# Patient Record
Sex: FEMALE | Race: WHITE | NOT HISPANIC OR LATINO
[De-identification: names, ages, dates, MRNs, and addresses within clinical notes are randomized per-mention and may not be internally consistent; named-entity substitution may affect disease eponyms.]

---

## 2017-01-18 ENCOUNTER — APPOINTMENT (OUTPATIENT)
Dept: ANTEPARTUM | Facility: CLINIC | Age: 27
End: 2017-01-18

## 2017-01-18 VITALS
SYSTOLIC BLOOD PRESSURE: 120 MMHG | DIASTOLIC BLOOD PRESSURE: 70 MMHG | HEIGHT: 66 IN | BODY MASS INDEX: 36.64 KG/M2 | WEIGHT: 228 LBS

## 2017-01-18 VITALS — HEART RATE: 98 BPM | OXYGEN SATURATION: 100 %

## 2017-01-18 DIAGNOSIS — Z82.49 FAMILY HISTORY OF ISCHEMIC HEART DISEASE AND OTHER DISEASES OF THE CIRCULATORY SYSTEM: ICD-10-CM

## 2017-01-18 DIAGNOSIS — Z87.891 PERSONAL HISTORY OF NICOTINE DEPENDENCE: ICD-10-CM

## 2017-01-18 DIAGNOSIS — Z33.1 PREGNANT STATE, INCIDENTAL: ICD-10-CM

## 2017-01-18 LAB
BILIRUB UR QL STRIP: NEGATIVE
CLARITY UR: NORMAL
COLLECTION METHOD: NORMAL
GLUCOSE UR-MCNC: NEGATIVE
HCG UR QL: 0.2 EU/DL
HGB UR QL STRIP.AUTO: NORMAL
KETONES UR-MCNC: NEGATIVE
LEUKOCYTE ESTERASE UR QL STRIP: NEGATIVE
NITRITE UR QL STRIP: NEGATIVE
PH UR STRIP: 8
PROT UR STRIP-MCNC: 1
SP GR UR STRIP: 1.03

## 2017-03-15 ENCOUNTER — APPOINTMENT (OUTPATIENT)
Dept: ANTEPARTUM | Facility: CLINIC | Age: 27
End: 2017-03-15

## 2017-03-15 VITALS — SYSTOLIC BLOOD PRESSURE: 100 MMHG | WEIGHT: 237.9 LBS | DIASTOLIC BLOOD PRESSURE: 64 MMHG | BODY MASS INDEX: 38.4 KG/M2

## 2017-03-15 VITALS — OXYGEN SATURATION: 98 % | HEART RATE: 88 BPM | TEMPERATURE: 98.8 F

## 2017-03-24 ENCOUNTER — INPATIENT (INPATIENT)
Facility: HOSPITAL | Age: 27
LOS: 1 days | Discharge: HOME | End: 2017-03-26
Attending: OBSTETRICS & GYNECOLOGY | Admitting: OBSTETRICS & GYNECOLOGY

## 2017-06-11 ENCOUNTER — EMERGENCY (EMERGENCY)
Facility: HOSPITAL | Age: 27
LOS: 0 days | Discharge: HOME | End: 2017-06-11

## 2017-06-11 DIAGNOSIS — O44.00 COMPLETE PLACENTA PREVIA NOS OR WITHOUT HEMORRHAGE, UNSPECIFIED TRIMESTER: ICD-10-CM

## 2017-06-28 DIAGNOSIS — R45.851 SUICIDAL IDEATIONS: ICD-10-CM

## 2017-06-28 DIAGNOSIS — F41.9 ANXIETY DISORDER, UNSPECIFIED: ICD-10-CM

## 2017-06-28 DIAGNOSIS — Z87.891 PERSONAL HISTORY OF NICOTINE DEPENDENCE: ICD-10-CM

## 2017-08-16 LAB
BILIRUB UR QL STRIP: NEGATIVE
CLARITY UR: CLEAR
COLLECTION METHOD: NORMAL
GLUCOSE UR-MCNC: NEGATIVE
HCG UR QL: 0.2 EU/DL
HGB UR QL STRIP.AUTO: NEGATIVE
KETONES UR-MCNC: NEGATIVE
LEUKOCYTE ESTERASE UR QL STRIP: NEGATIVE
NITRITE UR QL STRIP: NEGATIVE
PH UR STRIP: 6.5
PROT UR STRIP-MCNC: NEGATIVE
SP GR UR STRIP: 1.03

## 2017-09-21 DIAGNOSIS — Z37.1 SINGLE STILLBIRTH: ICD-10-CM

## 2017-09-21 DIAGNOSIS — O36.4XX0 MATERNAL CARE FOR INTRAUTERINE DEATH, NOT APPLICABLE OR UNSPECIFIED: ICD-10-CM

## 2017-09-21 DIAGNOSIS — Z3A.37 37 WEEKS GESTATION OF PREGNANCY: ICD-10-CM

## 2017-09-21 DIAGNOSIS — O44.03 COMPLETE PLACENTA PREVIA NOS OR WITHOUT HEMORRHAGE, THIRD TRIMESTER: ICD-10-CM

## 2017-10-24 ENCOUNTER — EMERGENCY (EMERGENCY)
Facility: HOSPITAL | Age: 27
LOS: 0 days | Discharge: HOME | End: 2017-10-24

## 2017-10-24 DIAGNOSIS — R07.89 OTHER CHEST PAIN: ICD-10-CM

## 2017-10-24 DIAGNOSIS — R06.02 SHORTNESS OF BREATH: ICD-10-CM

## 2017-10-24 DIAGNOSIS — R11.0 NAUSEA: ICD-10-CM

## 2017-10-24 DIAGNOSIS — O44.00 COMPLETE PLACENTA PREVIA NOS OR WITHOUT HEMORRHAGE, UNSPECIFIED TRIMESTER: ICD-10-CM

## 2017-10-24 DIAGNOSIS — F17.210 NICOTINE DEPENDENCE, CIGARETTES, UNCOMPLICATED: ICD-10-CM

## 2018-05-09 ENCOUNTER — OUTPATIENT (OUTPATIENT)
Dept: OUTPATIENT SERVICES | Facility: HOSPITAL | Age: 28
LOS: 1 days | Discharge: HOME | End: 2018-05-09

## 2018-05-09 ENCOUNTER — APPOINTMENT (OUTPATIENT)
Dept: ANTEPARTUM | Facility: CLINIC | Age: 28
End: 2018-05-09

## 2018-05-09 VITALS
WEIGHT: 247.13 LBS | TEMPERATURE: 98.4 F | HEART RATE: 97 BPM | DIASTOLIC BLOOD PRESSURE: 77 MMHG | HEIGHT: 66 IN | BODY MASS INDEX: 39.72 KG/M2 | SYSTOLIC BLOOD PRESSURE: 119 MMHG | OXYGEN SATURATION: 100 %

## 2018-05-09 DIAGNOSIS — Z87.59 PERSONAL HISTORY OF OTHER COMPLICATIONS OF PREGNANCY, CHILDBIRTH AND THE PUERPERIUM: ICD-10-CM

## 2018-05-09 DIAGNOSIS — O44.03 COMPLETE PLACENTA PREVIA NOS OR WITHOUT HEMORRHAGE, THIRD TRIMESTER: ICD-10-CM

## 2018-05-09 LAB
BILIRUB UR QL STRIP: NEGATIVE
CLARITY UR: CLEAR
COLLECTION METHOD: NORMAL
FETAL HEART DESCRIPTION: NORMAL
FETAL HEART RATE (BPM): 154
FETAL MOVEMENT: PRESENT
FETAL PRESENTATION: NORMAL
GLUCOSE UR-MCNC: NEGATIVE
HCG UR QL: 0.2 EU/DL
HGB UR QL STRIP.AUTO: NEGATIVE
KETONES UR-MCNC: NEGATIVE
LEUKOCYTE ESTERASE UR QL STRIP: NEGATIVE
NITRITE UR QL STRIP: NEGATIVE
OB COMMENTS: NORMAL
PH UR STRIP: 7.5
PROT UR STRIP-MCNC: NEGATIVE
SCHEDULED VISIT: YES
SP GR UR STRIP: 1.03
URINE ALBUMIN/PROTEIN: NEGATIVE
URINE GLUCOSE: NEGATIVE
URINE KETONES: NEGATIVE
WEEKS GESTATION: NORMAL

## 2018-05-16 DIAGNOSIS — O35.8XX1 MATERNAL CARE FOR OTHER (SUSPECTED) FETAL ABNORMALITY AND DAMAGE, FETUS 1: ICD-10-CM

## 2018-05-16 DIAGNOSIS — O09.293 SUPERVISION OF PREGNANCY WITH OTHER POOR REPRODUCTIVE OR OBSTETRIC HISTORY, THIRD TRIMESTER: ICD-10-CM

## 2018-07-06 ENCOUNTER — OUTPATIENT (OUTPATIENT)
Dept: OUTPATIENT SERVICES | Facility: HOSPITAL | Age: 28
LOS: 1 days | Discharge: HOME | End: 2018-07-06

## 2018-07-06 DIAGNOSIS — Z01.818 ENCOUNTER FOR OTHER PREPROCEDURAL EXAMINATION: ICD-10-CM

## 2018-07-06 LAB
ALBUMIN SERPL ELPH-MCNC: 3.9 G/DL — SIGNIFICANT CHANGE UP (ref 3.5–5.2)
ALP SERPL-CCNC: 109 U/L — SIGNIFICANT CHANGE UP (ref 30–115)
ALT FLD-CCNC: 10 U/L — SIGNIFICANT CHANGE UP (ref 0–41)
ANION GAP SERPL CALC-SCNC: 15 MMOL/L — HIGH (ref 7–14)
APPEARANCE UR: (no result)
APTT BLD: 26.9 SEC — LOW (ref 27–39.2)
AST SERPL-CCNC: 14 U/L — SIGNIFICANT CHANGE UP (ref 0–41)
BILIRUB SERPL-MCNC: 0.4 MG/DL — SIGNIFICANT CHANGE UP (ref 0.2–1.2)
BILIRUB UR-MCNC: NEGATIVE — SIGNIFICANT CHANGE UP
BLD GP AB SCN SERPL QL: SIGNIFICANT CHANGE UP
BUN SERPL-MCNC: 5 MG/DL — LOW (ref 10–20)
CALCIUM SERPL-MCNC: 9.2 MG/DL — SIGNIFICANT CHANGE UP (ref 8.5–10.1)
CHLORIDE SERPL-SCNC: 102 MMOL/L — SIGNIFICANT CHANGE UP (ref 98–110)
CO2 SERPL-SCNC: 20 MMOL/L — SIGNIFICANT CHANGE UP (ref 17–32)
COLOR SPEC: YELLOW — SIGNIFICANT CHANGE UP
CREAT SERPL-MCNC: 0.7 MG/DL — SIGNIFICANT CHANGE UP (ref 0.7–1.5)
DIFF PNL FLD: NEGATIVE — SIGNIFICANT CHANGE UP
EPI CELLS # UR: (no result) /HPF
GLUCOSE SERPL-MCNC: 68 MG/DL — LOW (ref 70–99)
GLUCOSE UR QL: NEGATIVE MG/DL — SIGNIFICANT CHANGE UP
HCT VFR BLD CALC: 37.7 % — SIGNIFICANT CHANGE UP (ref 37–47)
HGB BLD-MCNC: 12.4 G/DL — SIGNIFICANT CHANGE UP (ref 12–16)
INR BLD: 0.94 RATIO — SIGNIFICANT CHANGE UP (ref 0.65–1.3)
KETONES UR-MCNC: NEGATIVE — SIGNIFICANT CHANGE UP
LEUKOCYTE ESTERASE UR-ACNC: (no result)
MCHC RBC-ENTMCNC: 30 PG — SIGNIFICANT CHANGE UP (ref 27–31)
MCHC RBC-ENTMCNC: 32.9 G/DL — SIGNIFICANT CHANGE UP (ref 32–37)
MCV RBC AUTO: 91.1 FL — SIGNIFICANT CHANGE UP (ref 81–99)
NITRITE UR-MCNC: NEGATIVE — SIGNIFICANT CHANGE UP
NRBC # BLD: 0 /100 WBCS — SIGNIFICANT CHANGE UP (ref 0–0)
PH UR: 6 — SIGNIFICANT CHANGE UP (ref 5–8)
PLATELET # BLD AUTO: 307 K/UL — SIGNIFICANT CHANGE UP (ref 130–400)
POTASSIUM SERPL-MCNC: 4.3 MMOL/L — SIGNIFICANT CHANGE UP (ref 3.5–5)
POTASSIUM SERPL-SCNC: 4.3 MMOL/L — SIGNIFICANT CHANGE UP (ref 3.5–5)
PROT SERPL-MCNC: 6.6 G/DL — SIGNIFICANT CHANGE UP (ref 6–8)
PROT UR-MCNC: NEGATIVE MG/DL — SIGNIFICANT CHANGE UP
PROTHROM AB SERPL-ACNC: 10.2 SEC — SIGNIFICANT CHANGE UP (ref 9.95–12.87)
RBC # BLD: 4.14 M/UL — LOW (ref 4.2–5.4)
RBC # FLD: 14.1 % — SIGNIFICANT CHANGE UP (ref 11.5–14.5)
RBC CASTS # UR COMP ASSIST: SIGNIFICANT CHANGE UP /HPF
SODIUM SERPL-SCNC: 137 MMOL/L — SIGNIFICANT CHANGE UP (ref 135–146)
SP GR SPEC: 1.01 — SIGNIFICANT CHANGE UP (ref 1.01–1.03)
TYPE + AB SCN PNL BLD: SIGNIFICANT CHANGE UP
UROBILINOGEN FLD QL: 0.2 MG/DL — SIGNIFICANT CHANGE UP (ref 0.2–0.2)
WBC # BLD: 14.08 K/UL — HIGH (ref 4.8–10.8)
WBC # FLD AUTO: 14.08 K/UL — HIGH (ref 4.8–10.8)
WBC UR QL: (no result) /HPF

## 2018-07-08 LAB — T PALLIDUM AB TITR SER: NEGATIVE — SIGNIFICANT CHANGE UP

## 2018-07-09 ENCOUNTER — RESULT REVIEW (OUTPATIENT)
Age: 28
End: 2018-07-09

## 2018-07-09 ENCOUNTER — INPATIENT (INPATIENT)
Facility: HOSPITAL | Age: 28
LOS: 1 days | Discharge: HOME | End: 2018-07-11
Attending: OBSTETRICS & GYNECOLOGY | Admitting: OBSTETRICS & GYNECOLOGY

## 2018-07-09 VITALS — TEMPERATURE: 99 F

## 2018-07-09 DIAGNOSIS — Z98.890 OTHER SPECIFIED POSTPROCEDURAL STATES: Chronic | ICD-10-CM

## 2018-07-09 RX ORDER — CEFAZOLIN SODIUM 1 G
2000 VIAL (EA) INJECTION EVERY 8 HOURS
Qty: 0 | Refills: 0 | Status: COMPLETED | OUTPATIENT
Start: 2018-07-09 | End: 2018-07-10

## 2018-07-09 RX ORDER — MORPHINE SULFATE 50 MG/1
0.2 CAPSULE, EXTENDED RELEASE ORAL ONCE
Qty: 0 | Refills: 0 | Status: DISCONTINUED | OUTPATIENT
Start: 2018-07-09 | End: 2018-07-11

## 2018-07-09 RX ORDER — SODIUM CHLORIDE 9 MG/ML
1000 INJECTION, SOLUTION INTRAVENOUS
Qty: 0 | Refills: 0 | Status: DISCONTINUED | OUTPATIENT
Start: 2018-07-09 | End: 2018-07-11

## 2018-07-09 RX ORDER — AER TRAVELER 0.5 G/1
1 SOLUTION RECTAL; TOPICAL EVERY 4 HOURS
Qty: 0 | Refills: 0 | Status: DISCONTINUED | OUTPATIENT
Start: 2018-07-09 | End: 2018-07-11

## 2018-07-09 RX ORDER — FERROUS SULFATE 325(65) MG
325 TABLET ORAL DAILY
Qty: 0 | Refills: 0 | Status: DISCONTINUED | OUTPATIENT
Start: 2018-07-09 | End: 2018-07-11

## 2018-07-09 RX ORDER — KETOROLAC TROMETHAMINE 30 MG/ML
30 SYRINGE (ML) INJECTION ONCE
Qty: 0 | Refills: 0 | Status: DISCONTINUED | OUTPATIENT
Start: 2018-07-09 | End: 2018-07-09

## 2018-07-09 RX ORDER — DOCUSATE SODIUM 100 MG
100 CAPSULE ORAL
Qty: 0 | Refills: 0 | Status: DISCONTINUED | OUTPATIENT
Start: 2018-07-09 | End: 2018-07-11

## 2018-07-09 RX ORDER — OXYCODONE AND ACETAMINOPHEN 5; 325 MG/1; MG/1
2 TABLET ORAL EVERY 6 HOURS
Qty: 0 | Refills: 0 | Status: DISCONTINUED | OUTPATIENT
Start: 2018-07-09 | End: 2018-07-11

## 2018-07-09 RX ORDER — OXYTOCIN 10 UNIT/ML
41.67 VIAL (ML) INJECTION
Qty: 20 | Refills: 0 | Status: DISCONTINUED | OUTPATIENT
Start: 2018-07-09 | End: 2018-07-11

## 2018-07-09 RX ORDER — IBUPROFEN 200 MG
600 TABLET ORAL EVERY 6 HOURS
Qty: 0 | Refills: 0 | Status: DISCONTINUED | OUTPATIENT
Start: 2018-07-09 | End: 2018-07-11

## 2018-07-09 RX ORDER — ENOXAPARIN SODIUM 100 MG/ML
40 INJECTION SUBCUTANEOUS AT BEDTIME
Qty: 0 | Refills: 0 | Status: DISCONTINUED | OUTPATIENT
Start: 2018-07-09 | End: 2018-07-11

## 2018-07-09 RX ORDER — CEFAZOLIN SODIUM 1 G
2000 VIAL (EA) INJECTION ONCE
Qty: 0 | Refills: 0 | Status: COMPLETED | OUTPATIENT
Start: 2018-07-09 | End: 2018-07-09

## 2018-07-09 RX ORDER — DIPHENHYDRAMINE HCL 50 MG
25 CAPSULE ORAL EVERY 6 HOURS
Qty: 0 | Refills: 0 | Status: DISCONTINUED | OUTPATIENT
Start: 2018-07-09 | End: 2018-07-11

## 2018-07-09 RX ORDER — BENZOCAINE 10 %
1 GEL (GRAM) MUCOUS MEMBRANE EVERY 6 HOURS
Qty: 0 | Refills: 0 | Status: DISCONTINUED | OUTPATIENT
Start: 2018-07-09 | End: 2018-07-11

## 2018-07-09 RX ORDER — SIMETHICONE 80 MG/1
80 TABLET, CHEWABLE ORAL EVERY 4 HOURS
Qty: 0 | Refills: 0 | Status: DISCONTINUED | OUTPATIENT
Start: 2018-07-09 | End: 2018-07-11

## 2018-07-09 RX ORDER — OXYCODONE AND ACETAMINOPHEN 5; 325 MG/1; MG/1
1 TABLET ORAL
Qty: 0 | Refills: 0 | Status: DISCONTINUED | OUTPATIENT
Start: 2018-07-09 | End: 2018-07-11

## 2018-07-09 RX ORDER — NALOXONE HYDROCHLORIDE 4 MG/.1ML
0.1 SPRAY NASAL
Qty: 0 | Refills: 0 | Status: DISCONTINUED | OUTPATIENT
Start: 2018-07-09 | End: 2018-07-11

## 2018-07-09 RX ORDER — ONDANSETRON 8 MG/1
4 TABLET, FILM COATED ORAL EVERY 6 HOURS
Qty: 0 | Refills: 0 | Status: DISCONTINUED | OUTPATIENT
Start: 2018-07-09 | End: 2018-07-11

## 2018-07-09 RX ORDER — DIBUCAINE 1 %
1 OINTMENT (GRAM) RECTAL EVERY 4 HOURS
Qty: 0 | Refills: 0 | Status: DISCONTINUED | OUTPATIENT
Start: 2018-07-09 | End: 2018-07-11

## 2018-07-09 RX ORDER — GLYCERIN ADULT
1 SUPPOSITORY, RECTAL RECTAL AT BEDTIME
Qty: 0 | Refills: 0 | Status: DISCONTINUED | OUTPATIENT
Start: 2018-07-09 | End: 2018-07-11

## 2018-07-09 RX ORDER — ACETAMINOPHEN 500 MG
650 TABLET ORAL EVERY 6 HOURS
Qty: 0 | Refills: 0 | Status: DISCONTINUED | OUTPATIENT
Start: 2018-07-09 | End: 2018-07-11

## 2018-07-09 RX ORDER — BUTORPHANOL TARTRATE 2 MG/ML
2 INJECTION, SOLUTION INTRAMUSCULAR; INTRAVENOUS ONCE
Qty: 0 | Refills: 0 | Status: DISCONTINUED | OUTPATIENT
Start: 2018-07-09 | End: 2018-07-11

## 2018-07-09 RX ORDER — SODIUM CHLORIDE 9 MG/ML
1000 INJECTION, SOLUTION INTRAVENOUS ONCE
Qty: 0 | Refills: 0 | Status: DISCONTINUED | OUTPATIENT
Start: 2018-07-09 | End: 2018-07-11

## 2018-07-09 RX ADMIN — Medication 100 MILLIGRAM(S): at 22:11

## 2018-07-09 RX ADMIN — Medication 100 MILLIGRAM(S): at 10:33

## 2018-07-09 RX ADMIN — Medication 30 MILLIGRAM(S): at 14:00

## 2018-07-09 NOTE — PACU DISCHARGE NOTE - COMMENTS
No anesthesia complications  Awake and alert  Airway:Patent  Pain:0/10  BP:105/57  HR:92  RR:16  Temp:97.9  SpO2:98%  PONV:None

## 2018-07-09 NOTE — BRIEF OPERATIVE NOTE - OPERATION/FINDINGS
Clear amniotic fluid noted. Live male infant delivered in vertex position, APGARS 9, 9, weighing 3370g. Normal uterus and bilateral fallopian tubes and ovaries.

## 2018-07-09 NOTE — PRE-ANESTHESIA EVALUATION ADULT - NSANTHOSAYNRD_GEN_A_CORE
No. NILSA screening performed.  STOP BANG Legend: 0-2 = LOW Risk; 3-4 = INTERMEDIATE Risk; 5-8 = HIGH Risk

## 2018-07-09 NOTE — PROGRESS NOTE ADULT - SUBJECTIVE AND OBJECTIVE BOX
PGY1 Postpartum Note     Subjective:   Patient doing well. No complaints.   Minimal vaginal bleeding. Pain controlled with duramorph.  Breastfeeding.  Cross in place. 	    Objective:   T(F): 97.8 ( @ 12:35), Max: 99.4 ( @ 08:47)  HR: 88 ( @ 14:45)  BP: 111/51 ( @ 14:36) (100/46 - 116/49)  RR: 18 ( @ 09:13)  SpO2: 99% ( @ :45) (81% - 99%)    Gen: NAD  CVS: RRR, normal S1, S2  Lungs: CTAB, no wheezing, no rales/rhonchi  Abdomen: soft, non tender, non distended, BS +, dressing in place, dry and clean  Fundus: firm, non tender, below umbilicus  LE: no edema or calf tenderness bilaterally  Lochia: Minimal Rubra, nonodorous  175cc/hr (3-5pm) - adequate (minimum 58cc/hr)    Labs:  Preop: 14.08>12.4/37.7<307    Medications:  ceFAZolin   IVPB   100 mL/Hr IV Intermittent ( @ 10:33)  ketorolac   Injectable   30 milliGRAM(s) IV Push ( @ 14:00)      Assessment:   28y now , s/p repeat C/S, POD#0, doing well.    Plan:  -Routine postpartum care  -f/u AM labs   -d/c duramorph @0300  -d/c [place] w/ duramorph @0300  -advance to clear diet @1900    Dr. Garcia to be made aware. PGY1 Postpartum Note     Subjective:   Patient doing well. No complaints.   Minimal vaginal bleeding. Pain controlled with duramorph.  Breastfeeding.  Cross in place. 	    Objective:   T(F): 97.8 ( @ 12:35), Max: 99.4 ( @ 08:47)  HR: 88 ( @ 14:45)  BP: 111/51 ( @ 14:36) (100/46 - 116/49)  RR: 18 ( @ 09:13)  SpO2: 99% ( @ :45) (81% - 99%)    Gen: NAD  CVS: RRR, normal S1, S2  Lungs: CTAB, no wheezing, no rales/rhonchi  Abdomen: soft, non tender, non distended, BS +, dressing in place, dry and clean  Fundus: firm, non tender, below umbilicus  LE: no edema or calf tenderness bilaterally  Lochia: Minimal Rubra, nonodorous  175cc/hr (3-5pm) - adequate (minimum 58cc/hr)    Labs:  Preop: 14.08>12.4/37.7<307    Medications:  ceFAZolin   IVPB   100 mL/Hr IV Intermittent ( @ 10:33)  ketorolac   Injectable   30 milliGRAM(s) IV Push ( @ 14:00)      Assessment:   28y now , s/p repeat C/S, POD#0, doing well.    Plan:  -Routine postpartum care  -f/u AM labs   -d/c duramorph @0300  -d/c Cross w/ duramorph @0300  -advance to clear diet @1900    Dr. Garcia aware. Dr. Michael to be made aware.

## 2018-07-09 NOTE — OB PROVIDER H&P - NS_OBGYNHISTORY_OBGYN_ALL_OB_FT
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 Denies abnormal pap smears, fibroids or STDs.

## 2018-07-09 NOTE — OB PROVIDER H&P - NSHPPHYSICALEXAM_GEN_ALL_CORE
Vital Signs Last 24 Hrs  T(C): 37.4 (09 Jul 2018 09:13), Max: 37.4 (09 Jul 2018 08:47)  T(F): 99.4 (09 Jul 2018 08:47), Max: 99.4 (09 Jul 2018 08:47)  HR: 90 (09 Jul 2018 09:13) (90 - 90)  BP: 113/68 (09 Jul 2018 09:13) (113/68 - 113/68)  RR: 18 (09 Jul 2018 09:13) (18 - 18)    udip neg  Gen: NAD  Abd: soft, non tender, no palpable ctx, EFW 3500  Pelvic: deferred  /mod/+accels  [place]: none

## 2018-07-09 NOTE — OB PROVIDER H&P - NSNYCREQUIREMENTS_OBGYN_ALL_OB
ADD [facility] REQUIREMENTS SECTION ([facility]/J/SI)...

## 2018-07-09 NOTE — OB PROVIDER H&P - ASSESSMENT
29 yo  at 37w1d, GBS neg, hx of FT IUFD with c/s for cord accident, placenta previa, for scheduled repeat  section  -Admit  -Admission labs  -NPO  -IVF  -Ancef  -EFM, toco  -Cross, surgical prep  -OR and Anesthesia aware.    Dr. Garcia and Dr. Prakash aware.

## 2018-07-09 NOTE — OB PROVIDER H&P - HISTORY OF PRESENT ILLNESS
29 yo  at 37w1d with XAVIER  by first trimester sono presents for scheduled repeat  section. Good FM, denies LOF, VB or ctx at this time. Denies complications during this pregnancy. GBS pos. 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   FT  x1, no complications, 7-11  etopx3 with D&C    Gynhx: PCOS, denies medication or intervention. Denies abnormal pap smears, fibroids or STDs.

## 2018-07-09 NOTE — OB PROVIDER H&P - NSHPLABSRESULTS_GEN_ALL_CORE
Sono  12w2d: Normal Nuchal  14w5d: + FH, EFW 120g,  FH, post placenta    GCT 79  HSVI: neg  HSVII: neg    Claritest neg  Inherigen neg    Preop Labs  14.08>12.4/37.7<307  137/4.3/102/20/5/0.7<68  UA mod LE  Coags: 10.2/0.94/26.9  B neg

## 2018-07-10 LAB
BASOPHILS # BLD AUTO: 0.02 K/UL — SIGNIFICANT CHANGE UP (ref 0–0.2)
BASOPHILS NFR BLD AUTO: 0.1 % — SIGNIFICANT CHANGE UP (ref 0–1)
EOSINOPHIL # BLD AUTO: 0.02 K/UL — SIGNIFICANT CHANGE UP (ref 0–0.7)
EOSINOPHIL NFR BLD AUTO: 0.1 % — SIGNIFICANT CHANGE UP (ref 0–8)
FETAL SCREEN: SIGNIFICANT CHANGE UP
HCT VFR BLD CALC: 32.8 % — LOW (ref 37–47)
HGB BLD-MCNC: 10.7 G/DL — LOW (ref 12–16)
IMM GRANULOCYTES NFR BLD AUTO: 0.9 % — HIGH (ref 0.1–0.3)
LYMPHOCYTES # BLD AUTO: 23 % — SIGNIFICANT CHANGE UP (ref 20.5–51.1)
LYMPHOCYTES # BLD AUTO: 3.17 K/UL — SIGNIFICANT CHANGE UP (ref 1.2–3.4)
MCHC RBC-ENTMCNC: 30.1 PG — SIGNIFICANT CHANGE UP (ref 27–31)
MCHC RBC-ENTMCNC: 32.6 G/DL — SIGNIFICANT CHANGE UP (ref 32–37)
MCV RBC AUTO: 92.4 FL — SIGNIFICANT CHANGE UP (ref 81–99)
MONOCYTES # BLD AUTO: 1.31 K/UL — HIGH (ref 0.1–0.6)
MONOCYTES NFR BLD AUTO: 9.5 % — HIGH (ref 1.7–9.3)
NEUTROPHILS # BLD AUTO: 9.14 K/UL — HIGH (ref 1.4–6.5)
NEUTROPHILS NFR BLD AUTO: 66.4 % — SIGNIFICANT CHANGE UP (ref 42.2–75.2)
NRBC # BLD: 0 /100 WBCS — SIGNIFICANT CHANGE UP (ref 0–0)
PLATELET # BLD AUTO: 273 K/UL — SIGNIFICANT CHANGE UP (ref 130–400)
RBC # BLD: 3.55 M/UL — LOW (ref 4.2–5.4)
RBC # FLD: 14.1 % — SIGNIFICANT CHANGE UP (ref 11.5–14.5)
WBC # BLD: 13.78 K/UL — HIGH (ref 4.8–10.8)
WBC # FLD AUTO: 13.78 K/UL — HIGH (ref 4.8–10.8)

## 2018-07-10 RX ORDER — KETOROLAC TROMETHAMINE 30 MG/ML
15 SYRINGE (ML) INJECTION ONCE
Qty: 0 | Refills: 0 | Status: DISCONTINUED | OUTPATIENT
Start: 2018-07-10 | End: 2018-07-10

## 2018-07-10 RX ADMIN — Medication 100 MILLIGRAM(S): at 02:08

## 2018-07-10 RX ADMIN — SIMETHICONE 80 MILLIGRAM(S): 80 TABLET, CHEWABLE ORAL at 02:07

## 2018-07-10 RX ADMIN — OXYCODONE AND ACETAMINOPHEN 1 TABLET(S): 5; 325 TABLET ORAL at 18:31

## 2018-07-10 RX ADMIN — Medication 15 MILLIGRAM(S): at 02:03

## 2018-07-10 RX ADMIN — Medication 1 TABLET(S): at 11:11

## 2018-07-10 RX ADMIN — OXYCODONE AND ACETAMINOPHEN 2 TABLET(S): 5; 325 TABLET ORAL at 23:22

## 2018-07-10 RX ADMIN — OXYCODONE AND ACETAMINOPHEN 2 TABLET(S): 5; 325 TABLET ORAL at 22:51

## 2018-07-10 RX ADMIN — Medication 100 MILLIGRAM(S): at 05:58

## 2018-07-10 RX ADMIN — Medication 15 MILLIGRAM(S): at 02:33

## 2018-07-10 RX ADMIN — ENOXAPARIN SODIUM 40 MILLIGRAM(S): 100 INJECTION SUBCUTANEOUS at 21:16

## 2018-07-10 RX ADMIN — Medication 650 MILLIGRAM(S): at 08:43

## 2018-07-10 RX ADMIN — ENOXAPARIN SODIUM 40 MILLIGRAM(S): 100 INJECTION SUBCUTANEOUS at 01:07

## 2018-07-10 RX ADMIN — OXYCODONE AND ACETAMINOPHEN 1 TABLET(S): 5; 325 TABLET ORAL at 14:32

## 2018-07-10 RX ADMIN — Medication 325 MILLIGRAM(S): at 11:11

## 2018-07-10 NOTE — PROGRESS NOTE ADULT - SUBJECTIVE AND OBJECTIVE BOX
PGY 1 Post Op c/s note    Pt seen and evaluated at bedside, POD1 , recovering well. Received Toradol this AM, now resting comfortably. Tolerated OOB to chair and clear liquid diet. Belching but not passing gas, no BM. Rice in place. Breastfeeding.    Physical Exam  Vital Signs Last 24 Hrs  T(C): 37 (10 Jul 2018 05:09), Max: 37.4 (2018 08:47)  T(F): 98.6 (10 Jul 2018 05:09), Max: 99.4 (2018 08:47)  HR: 87 (10 Jul 2018 05:09) (73 - 104)  BP: 92/53 (10 Jul 2018 05:09) (92/53 - 117/56)  RR: 18 (10 Jul 2018 05:09) (18 - 20)  SpO2: 99% (2018 14:45) (81% - 99%)    Gen: NAD  CVS: RRR, normal S1, S2  Lungs: CTAB  Abdomen: soft, non tender, non distended, +BS  -Incision: clean, dry, intact, steris in place. No Surrounding tenderness or erythema  -Fundus: firm, appropriately tender, below umbilicus  LE: no edema or tenderness  Lochia: Minimal Rubra    Labs  AM CBC pending      Meds  acetaminophen   Tablet 650 milliGRAM(s) Oral every 6 hours PRN  benzocaine 20%/menthol 0.5% Spray 1 Spray(s) Topical every 6 hours PRN  butorphanol Injectable 2 milliGRAM(s) IV Push once PRN  dibucaine 1% Ointment 1 Application(s) Topical every 4 hours PRN  diphenhydrAMINE   Capsule 25 milliGRAM(s) Oral every 6 hours PRN  docusate sodium 100 milliGRAM(s) Oral two times a day PRN  enoxaparin Injectable 40 milliGRAM(s) SubCutaneous at bedtime  ferrous    sulfate 325 milliGRAM(s) Oral daily  glycerin Suppository - Adult 1 Suppository(s) Rectal at bedtime PRN  ibuprofen  Tablet 600 milliGRAM(s) Oral every 6 hours PRN  lactated ringers Bolus 1000 milliLiter(s) IV Bolus once  lactated ringers. 1000 milliLiter(s) IV Continuous <Continuous>  lactated ringers. 1000 milliLiter(s) IV Continuous <Continuous>  lactated ringers. 1000 milliLiter(s) IV Continuous <Continuous>  morphine PF Spinal 0.2 milliGRAM(s) IntraThecal. once  naloxone Injectable 0.1 milliGRAM(s) IV Push every 3 minutes PRN  ondansetron Injectable 4 milliGRAM(s) IV Push every 6 hours PRN  oxyCODONE    5 mG/acetaminophen 325 mG 1 Tablet(s) Oral every 3 hours PRN  oxyCODONE    5 mG/acetaminophen 325 mG 2 Tablet(s) Oral every 6 hours PRN  oxytocin Infusion 41.667 milliUNIT(s)/Min IV Continuous <Continuous>  prenatal multivitamin 1 Tablet(s) Oral daily  simethicone 80 milliGRAM(s) Chew every 4 hours PRN  witch hazel Pads 1 Application(s) Topical every 4 hours PRN      A/P  28y P3, s/p repeat scheduled  delivery, POD1, recovering well  -Will d/c rice now, TOV 1230  -Dressing changed  -Advance to fulls reg, and to regular once passing gas  -Pain mgt  -Encourage PO hydration, ambulation, incentive spirometry  -Anticipate discharge on POD3

## 2018-07-11 ENCOUNTER — TRANSCRIPTION ENCOUNTER (OUTPATIENT)
Age: 28
End: 2018-07-11

## 2018-07-11 VITALS
SYSTOLIC BLOOD PRESSURE: 136 MMHG | DIASTOLIC BLOOD PRESSURE: 65 MMHG | TEMPERATURE: 98 F | RESPIRATION RATE: 18 BRPM | HEART RATE: 93 BPM

## 2018-07-11 RX ORDER — IBUPROFEN 200 MG
1 TABLET ORAL
Qty: 0 | Refills: 0 | DISCHARGE
Start: 2018-07-11

## 2018-07-11 RX ORDER — DOCUSATE SODIUM 100 MG
1 CAPSULE ORAL
Qty: 60 | Refills: 1 | OUTPATIENT
Start: 2018-07-11 | End: 2018-09-08

## 2018-07-11 RX ORDER — ACETAMINOPHEN 500 MG
2 TABLET ORAL
Qty: 0 | Refills: 0 | DISCHARGE
Start: 2018-07-11

## 2018-07-11 RX ORDER — FERROUS SULFATE 325(65) MG
1 TABLET ORAL
Qty: 60 | Refills: 1
Start: 2018-07-11 | End: 2018-09-08

## 2018-07-11 RX ORDER — DOCUSATE SODIUM 100 MG
1 CAPSULE ORAL
Qty: 60 | Refills: 1
Start: 2018-07-11 | End: 2018-09-08

## 2018-07-11 RX ORDER — DOCUSATE SODIUM 100 MG
1 CAPSULE ORAL
Qty: 0 | Refills: 0 | DISCHARGE
Start: 2018-07-11

## 2018-07-11 RX ORDER — OXYCODONE AND ACETAMINOPHEN 5; 325 MG/1; MG/1
1 TABLET ORAL
Qty: 15 | Refills: 0
Start: 2018-07-11 | End: 2018-07-13

## 2018-07-11 RX ADMIN — OXYCODONE AND ACETAMINOPHEN 2 TABLET(S): 5; 325 TABLET ORAL at 08:30

## 2018-07-11 RX ADMIN — Medication 1 TABLET(S): at 11:48

## 2018-07-11 RX ADMIN — SIMETHICONE 80 MILLIGRAM(S): 80 TABLET, CHEWABLE ORAL at 00:19

## 2018-07-11 RX ADMIN — Medication 100 MILLIGRAM(S): at 08:32

## 2018-07-11 RX ADMIN — SIMETHICONE 80 MILLIGRAM(S): 80 TABLET, CHEWABLE ORAL at 08:32

## 2018-07-11 RX ADMIN — Medication 100 MILLIGRAM(S): at 00:19

## 2018-07-11 RX ADMIN — Medication 600 MILLIGRAM(S): at 14:39

## 2018-07-11 RX ADMIN — Medication 325 MILLIGRAM(S): at 11:48

## 2018-07-11 NOTE — DISCHARGE NOTE OB - PLAN OF CARE
Healthy Mother and baby Keep incision clean and dry. No heavy lifting x4 weeks. Nothing in the vagina for 6 weeks - no sex, tampons, douching, tub baths or pools. May Shower. Follow up in 1 week for incision check and 6 weeks for postpartum check.

## 2018-07-11 NOTE — DISCHARGE NOTE OB - CARE PLAN
Principal Discharge DX:	 delivery delivered  Goal:	Healthy Mother and baby  Assessment and plan of treatment:	Keep incision clean and dry. No heavy lifting x4 weeks. Nothing in the vagina for 6 weeks - no sex, tampons, douching, tub baths or pools. May Shower. Follow up in 1 week for incision check and 6 weeks for postpartum check.

## 2018-07-11 NOTE — DISCHARGE NOTE OB - PATIENT PORTAL LINK FT
You can access the [facility] Patient Portal, offered by [facility], by registering with the following website: [email or web address]

## 2018-07-11 NOTE — PROGRESS NOTE ADULT - SUBJECTIVE AND OBJECTIVE BOX
PGY 1 Post Op c/s note    Pt seen and evaluated at bedside, POD2, recovering well, no complaints. Pain well controlled with PO pain meds.  Pt is ambulating, tolerating regular diet.  Voiding well, not passing gas, +BM  Breastfeeding.    Physical Exam  Vital Signs Last 24 Hrs  T(C): 37.2 (2018 00:36), Max: 37.3 (10 Jul 2018 19:57)  T(F): 98.9 (2018 00:36), Max: 99.1 (10 Jul 2018 19:57)  HR: 87 (2018 04:00) (84 - 97)  BP: 112/62 (2018 04:00) (105/56 - 118/58)  RR: 20 (2018 00:36) (18 - 20)    Gen: NAD  CVS: RRR, normal S1, S2  Lungs: CTAB  Abdomen: soft, non tender, non distended, +BS  -Incision: clean, dry, intact, steris intact, no surrounding tenderness, or erythema  -Fundus: firm, appropriately tender, below umbilicus  LE: no edema or tenderness  Lochia: Minimal Rubra    Labs                        10.7   13.78 )-----------( 273      ( 10 Jul 2018 06:33 )             32.8       Meds  acetaminophen   Tablet 650 milliGRAM(s) Oral every 6 hours PRN  benzocaine 20%/menthol 0.5% Spray 1 Spray(s) Topical every 6 hours PRN  butorphanol Injectable 2 milliGRAM(s) IV Push once PRN  dibucaine 1% Ointment 1 Application(s) Topical every 4 hours PRN  diphenhydrAMINE   Capsule 25 milliGRAM(s) Oral every 6 hours PRN  docusate sodium 100 milliGRAM(s) Oral two times a day PRN  enoxaparin Injectable 40 milliGRAM(s) SubCutaneous at bedtime  ferrous    sulfate 325 milliGRAM(s) Oral daily  glycerin Suppository - Adult 1 Suppository(s) Rectal at bedtime PRN  ibuprofen  Tablet 600 milliGRAM(s) Oral every 6 hours PRN  lactated ringers Bolus 1000 milliLiter(s) IV Bolus once  lactated ringers. 1000 milliLiter(s) IV Continuous <Continuous>  lactated ringers. 1000 milliLiter(s) IV Continuous <Continuous>  lactated ringers. 1000 milliLiter(s) IV Continuous <Continuous>  morphine PF Spinal 0.2 milliGRAM(s) IntraThecal. once  naloxone Injectable 0.1 milliGRAM(s) IV Push every 3 minutes PRN  ondansetron Injectable 4 milliGRAM(s) IV Push every 6 hours PRN  oxyCODONE    5 mG/acetaminophen 325 mG 1 Tablet(s) Oral every 3 hours PRN  oxyCODONE    5 mG/acetaminophen 325 mG 2 Tablet(s) Oral every 6 hours PRN  oxytocin Infusion 41.667 milliUNIT(s)/Min IV Continuous <Continuous>  prenatal multivitamin 1 Tablet(s) Oral daily  simethicone 80 milliGRAM(s) Chew every 4 hours PRN  witch hazel Pads 1 Application(s) Topical every 4 hours PRN      A/P  28y P3, s/p repeat scheduled  delivery, POD2, recovering well.  -Encourage PO hydration, ambulation, incentive spirometry  -Requesting discharge today - will confirm with PMD  -Discharge instructions and precautions given  -f/u in 1 week for incision check, 6 weeks for postpartum check

## 2018-07-11 NOTE — DISCHARGE NOTE OB - MEDICATION SUMMARY - MEDICATIONS TO TAKE
I will START or STAY ON the medications listed below when I get home from the hospital:    acetaminophen 325 mg oral tablet  -- 2 tab(s) by mouth every 6 hours, As needed, For Temp greater than 38.5 C (101.3 F)  -- Indication: For C/SECTION    ibuprofen 600 mg oral tablet  -- 1 tab(s) by mouth every 6 hours, As needed, Mild pain or headache  -- Indication: For C/SECTION    oxyCODONE-acetaminophen 5 mg-325 mg oral tablet  -- 1 tab(s) by mouth every 4 hours (5 times/day), As Needed -Moderate Pain MDD:5 tabs  -- Indication: For C/SECTION    ferrous sulfate 325 mg (65 mg elemental iron) oral tablet  -- 1 tab(s) by mouth 2 times a day   -- Indication: For C/SECTION    docusate sodium 100 mg oral capsule  -- 1 cap(s) by mouth 2 times a day, As needed, Stool Softening  -- Indication: For C/SECTION    docusate sodium 100 mg oral capsule  -- 1 cap(s) by mouth 2 times a day, As needed, Stool Softening  -- Indication: For C/SECTION

## 2018-07-11 NOTE — DISCHARGE NOTE OB - CARE PROVIDER_API CALL
Esme Prakash), Obstetrics and Gynecology  [address] [place] [address]  Phone: [phone]  Fax: [phone]

## 2018-07-11 NOTE — DISCHARGE NOTE OB - HOSPITAL COURSE
28y P3, s/p uncomplicated repeat  delivery and postop course, recovering well. Ambulating, voiding well, tolerating PO, minimal pain and lochia. Discharged home on PPD2, Discharge instructions and precautions given, will follow up in 1 and 6 weeks with PMD for incision check and postpartum visit. Discharge stable at H/H:                          10.7   13.78 )-----------( 273      ( 10 Jul 2018 06:33 )             32.8 28y P3, s/p uncomplicated repeat  delivery and postop course, recovering well. Ambulating, voiding well, tolerating PO, minimal pain and lochia. Discharged home on PPD2, Discharge instructions and precautions given, will follow up in 1 and 6 weeks with PMD for incision check and postpartum visit. Prescriptions sent to pharmacy. Discharge stable at H/H:                          10.7   13.78 )-----------( 273      ( 10 Jul 2018 06:33 )             32.8

## 2018-07-12 LAB — SURGICAL PATHOLOGY STUDY: SIGNIFICANT CHANGE UP

## 2018-07-17 DIAGNOSIS — Z33.1 PREGNANT STATE, INCIDENTAL: ICD-10-CM

## 2018-07-17 DIAGNOSIS — O34.211 MATERNAL CARE FOR LOW TRANSVERSE SCAR FROM PREVIOUS CESAREAN DELIVERY: ICD-10-CM

## 2018-07-17 DIAGNOSIS — Z3A.37 37 WEEKS GESTATION OF PREGNANCY: ICD-10-CM

## 2018-07-17 DIAGNOSIS — Z87.59 PERSONAL HISTORY OF OTHER COMPLICATIONS OF PREGNANCY, CHILDBIRTH AND THE PUERPERIUM: ICD-10-CM

## 2022-04-19 NOTE — PRE-ANESTHESIA EVALUATION ADULT - NSPREOPDXFT_GEN_ALL_CORE
Urinary Tract Infections in Women    Urinary tract infections (UTIs) are most often caused by bacteria. These bacteria enter the urinary tract. The bacteria may come from outside the body. Or they may travel from the skin outside the rectum or vagina into the urethra. Female anatomy makes it easy for bacteria from the bowel to enter a woman’s urinary tract, which is the most common source of UTI. This means women develop UTIs more often than men. Pain in or around the urinary tract is a common UTI symptom. But the only way to know for sure if you have a UTI for the healthcare provider to test your urine. The two tests that may be done are the urinalysis and urine culture.  Types of UTIs  · Cystitis. A bladder infection (cystitis) is the most common UTI in women. You may have urgent or frequent urination. You may also have pain, burning when you urinate, and bloody urine.  · Urethritis. This is an inflamed urethra, which is the tube that carries urine from the bladder to outside the body. You may have lower stomach or back pain. You may also have urgent or frequent urination.  · Pyelonephritis. This is a kidney infection. If not treated, it can be serious and damage your kidneys. In severe cases, you may need to stay in the hospital. You may have a fever and lower back pain.  Medicines to treat a UTI  Most UTIs are treated with antibiotics. These kill the bacteria. The length of time you need to take them depends on the type of infection. It may be as short as 3 days. If you have repeated UTIs, you may need a low-dose antibiotic for several months. Take antibiotics exactly as directed. Don’t stop taking them until all of the medicine is gone. If you stop taking the antibiotic too soon, the infection may not go away. You may also develop a resistance to the antibiotic. This can make it much harder to treat.  Lifestyle changes to treat and prevent UTIs  The lifestyle changes below will help get rid of your UTI.  Repeat  section They may also help prevent future UTIs.  · Drink plenty of fluids. This includes water, juice, or other caffeine-free drinks. Fluids help flush bacteria out of your body.  · Empty your bladder. Always empty your bladder when you feel the urge to urinate. And always urinate before going to sleep. Urine that stays in your bladder can lead to infection. Try to urinate before and after sex as well.  · Practice good personal hygiene. Wipe yourself from front to back after using the toilet. This helps keep bacteria from getting into the urethra.  · Use condoms during sex. These help prevent UTIs caused by sexually transmitted bacteria. Also don't use spermicides during sex. These can increase the risk for UTIs. Choose other forms of birth control instead. For women who tend to get UTIs after sex, a low-dose of a preventive antibiotic may be used. Be sure to discuss this option with your healthcare provider.  · Follow up with your healthcare provider as directed. He or she may test to make sure the infection has cleared. If needed, more treatment may be started.  Date Last Reviewed: 1/1/2017  © 2781-6418 [employer]. [address] [place] [address], [place] [address]. All rights reserved. This information is not intended as a substitute for professional medical care. Always follow your healthcare professional's instructions.

## 2022-12-20 ENCOUNTER — INPATIENT (INPATIENT)
Facility: HOSPITAL | Age: 32
LOS: 2 days | Discharge: HOME | End: 2022-12-23
Attending: OBSTETRICS & GYNECOLOGY | Admitting: OBSTETRICS & GYNECOLOGY
Payer: MEDICAID

## 2022-12-20 VITALS
RESPIRATION RATE: 18 BRPM | HEART RATE: 117 BPM | WEIGHT: 227.08 LBS | TEMPERATURE: 97 F | DIASTOLIC BLOOD PRESSURE: 82 MMHG | SYSTOLIC BLOOD PRESSURE: 113 MMHG

## 2022-12-20 DIAGNOSIS — Z98.890 OTHER SPECIFIED POSTPROCEDURAL STATES: Chronic | ICD-10-CM

## 2022-12-20 LAB
ALBUMIN SERPL ELPH-MCNC: 4.8 G/DL — SIGNIFICANT CHANGE UP (ref 3.5–5.2)
ALP SERPL-CCNC: 79 U/L — SIGNIFICANT CHANGE UP (ref 30–115)
ALT FLD-CCNC: 45 U/L — HIGH (ref 0–41)
ANION GAP SERPL CALC-SCNC: 20 MMOL/L — HIGH (ref 7–14)
APPEARANCE UR: ABNORMAL
AST SERPL-CCNC: 29 U/L — SIGNIFICANT CHANGE UP (ref 0–41)
B-OH-BUTYR SERPL-SCNC: 2.6 MMOL/L — HIGH
BACTERIA # UR AUTO: ABNORMAL
BASE EXCESS BLDV CALC-SCNC: -6.2 MMOL/L — LOW (ref -2–3)
BASOPHILS # BLD AUTO: 0.03 K/UL — SIGNIFICANT CHANGE UP (ref 0–0.2)
BASOPHILS NFR BLD AUTO: 0.3 % — SIGNIFICANT CHANGE UP (ref 0–1)
BILIRUB SERPL-MCNC: 1.4 MG/DL — HIGH (ref 0.2–1.2)
BILIRUB UR-MCNC: ABNORMAL
BLD GP AB SCN SERPL QL: SIGNIFICANT CHANGE UP
BUN SERPL-MCNC: 7 MG/DL — LOW (ref 10–20)
CA-I SERPL-SCNC: 1.26 MMOL/L — SIGNIFICANT CHANGE UP (ref 1.15–1.33)
CALCIUM SERPL-MCNC: 10 MG/DL — SIGNIFICANT CHANGE UP (ref 8.4–10.4)
CHLORIDE SERPL-SCNC: 100 MMOL/L — SIGNIFICANT CHANGE UP (ref 98–110)
CO2 SERPL-SCNC: 16 MMOL/L — LOW (ref 17–32)
COLOR SPEC: ABNORMAL
CREAT SERPL-MCNC: 0.7 MG/DL — SIGNIFICANT CHANGE UP (ref 0.7–1.5)
DIFF PNL FLD: NEGATIVE — SIGNIFICANT CHANGE UP
EGFR: 116 ML/MIN/1.73M2 — SIGNIFICANT CHANGE UP
EOSINOPHIL # BLD AUTO: 0.05 K/UL — SIGNIFICANT CHANGE UP (ref 0–0.7)
EOSINOPHIL NFR BLD AUTO: 0.6 % — SIGNIFICANT CHANGE UP (ref 0–8)
EPI CELLS # UR: 9 /HPF — HIGH (ref 0–5)
GAS PNL BLDV: 132 MMOL/L — LOW (ref 136–145)
GAS PNL BLDV: SIGNIFICANT CHANGE UP
GLUCOSE SERPL-MCNC: 82 MG/DL — SIGNIFICANT CHANGE UP (ref 70–99)
GLUCOSE UR QL: NEGATIVE — SIGNIFICANT CHANGE UP
HCG SERPL-ACNC: HIGH MIU/ML
HCO3 BLDV-SCNC: 19 MMOL/L — LOW (ref 22–29)
HCT VFR BLD CALC: 45.6 % — SIGNIFICANT CHANGE UP (ref 37–47)
HCT VFR BLDA CALC: 44 % — SIGNIFICANT CHANGE UP (ref 39–51)
HGB BLD CALC-MCNC: 14.7 G/DL — SIGNIFICANT CHANGE UP (ref 12.6–17.4)
HGB BLD-MCNC: 16.1 G/DL — HIGH (ref 12–16)
HYALINE CASTS # UR AUTO: 31 /LPF — HIGH (ref 0–7)
IMM GRANULOCYTES NFR BLD AUTO: 0.2 % — SIGNIFICANT CHANGE UP (ref 0.1–0.3)
KETONES UR-MCNC: ABNORMAL
LACTATE BLDV-MCNC: 1.2 MMOL/L — SIGNIFICANT CHANGE UP (ref 0.5–2)
LEUKOCYTE ESTERASE UR-ACNC: ABNORMAL
LIDOCAIN IGE QN: 36 U/L — SIGNIFICANT CHANGE UP (ref 7–60)
LYMPHOCYTES # BLD AUTO: 2 K/UL — SIGNIFICANT CHANGE UP (ref 1.2–3.4)
LYMPHOCYTES # BLD AUTO: 22.3 % — SIGNIFICANT CHANGE UP (ref 20.5–51.1)
MCHC RBC-ENTMCNC: 30.6 PG — SIGNIFICANT CHANGE UP (ref 27–31)
MCHC RBC-ENTMCNC: 35.3 G/DL — SIGNIFICANT CHANGE UP (ref 32–37)
MCV RBC AUTO: 86.7 FL — SIGNIFICANT CHANGE UP (ref 81–99)
MONOCYTES # BLD AUTO: 0.72 K/UL — HIGH (ref 0.1–0.6)
MONOCYTES NFR BLD AUTO: 8 % — SIGNIFICANT CHANGE UP (ref 1.7–9.3)
NEUTROPHILS # BLD AUTO: 6.16 K/UL — SIGNIFICANT CHANGE UP (ref 1.4–6.5)
NEUTROPHILS NFR BLD AUTO: 68.6 % — SIGNIFICANT CHANGE UP (ref 42.2–75.2)
NITRITE UR-MCNC: NEGATIVE — SIGNIFICANT CHANGE UP
NRBC # BLD: 0 /100 WBCS — SIGNIFICANT CHANGE UP (ref 0–0)
PCO2 BLDV: 36 MMHG — LOW (ref 39–42)
PH BLDV: 7.33 — SIGNIFICANT CHANGE UP (ref 7.32–7.43)
PH UR: 6.5 — SIGNIFICANT CHANGE UP (ref 5–8)
PLATELET # BLD AUTO: 282 K/UL — SIGNIFICANT CHANGE UP (ref 130–400)
PO2 BLDV: 34 MMHG — SIGNIFICANT CHANGE UP
POTASSIUM BLDV-SCNC: 3.1 MMOL/L — LOW (ref 3.5–5.1)
POTASSIUM SERPL-MCNC: 4.2 MMOL/L — SIGNIFICANT CHANGE UP (ref 3.5–5)
POTASSIUM SERPL-SCNC: 4.2 MMOL/L — SIGNIFICANT CHANGE UP (ref 3.5–5)
PROT SERPL-MCNC: 8 G/DL — SIGNIFICANT CHANGE UP (ref 6–8)
PROT UR-MCNC: ABNORMAL
RBC # BLD: 5.26 M/UL — SIGNIFICANT CHANGE UP (ref 4.2–5.4)
RBC # FLD: 11.8 % — SIGNIFICANT CHANGE UP (ref 11.5–14.5)
RBC CASTS # UR COMP ASSIST: 2 /HPF — SIGNIFICANT CHANGE UP (ref 0–4)
SAO2 % BLDV: 57 % — SIGNIFICANT CHANGE UP
SARS-COV-2 RNA SPEC QL NAA+PROBE: SIGNIFICANT CHANGE UP
SODIUM SERPL-SCNC: 136 MMOL/L — SIGNIFICANT CHANGE UP (ref 135–146)
SP GR SPEC: 1.03 — SIGNIFICANT CHANGE UP (ref 1.01–1.03)
TROPONIN T SERPL-MCNC: <0.01 NG/ML — SIGNIFICANT CHANGE UP
UROBILINOGEN FLD QL: ABNORMAL
WBC # BLD: 8.98 K/UL — SIGNIFICANT CHANGE UP (ref 4.8–10.8)
WBC # FLD AUTO: 8.98 K/UL — SIGNIFICANT CHANGE UP (ref 4.8–10.8)
WBC UR QL: 50 /HPF — HIGH (ref 0–5)

## 2022-12-20 PROCEDURE — 99285 EMERGENCY DEPT VISIT HI MDM: CPT | Mod: 25

## 2022-12-20 PROCEDURE — 76815 OB US LIMITED FETUS(S): CPT | Mod: 26

## 2022-12-20 PROCEDURE — 93010 ELECTROCARDIOGRAM REPORT: CPT

## 2022-12-20 PROCEDURE — 71045 X-RAY EXAM CHEST 1 VIEW: CPT | Mod: 26

## 2022-12-20 RX ORDER — SODIUM CHLORIDE 9 MG/ML
1000 INJECTION, SOLUTION INTRAVENOUS
Refills: 0 | Status: DISCONTINUED | OUTPATIENT
Start: 2022-12-20 | End: 2022-12-22

## 2022-12-20 RX ORDER — ONDANSETRON 8 MG/1
4 TABLET, FILM COATED ORAL ONCE
Refills: 0 | Status: DISCONTINUED | OUTPATIENT
Start: 2022-12-20 | End: 2022-12-20

## 2022-12-20 RX ORDER — THIAMINE MONONITRATE (VIT B1) 100 MG
100 TABLET ORAL ONCE
Refills: 0 | Status: COMPLETED | OUTPATIENT
Start: 2022-12-20 | End: 2022-12-20

## 2022-12-20 RX ORDER — SENNA PLUS 8.6 MG/1
2 TABLET ORAL AT BEDTIME
Refills: 0 | Status: DISCONTINUED | OUTPATIENT
Start: 2022-12-20 | End: 2022-12-23

## 2022-12-20 RX ORDER — METOCLOPRAMIDE HCL 10 MG
10 TABLET ORAL ONCE
Refills: 0 | Status: COMPLETED | OUTPATIENT
Start: 2022-12-20 | End: 2022-12-20

## 2022-12-20 RX ORDER — FAMOTIDINE 10 MG/ML
40 INJECTION INTRAVENOUS ONCE
Refills: 0 | Status: COMPLETED | OUTPATIENT
Start: 2022-12-20 | End: 2022-12-20

## 2022-12-20 RX ORDER — SODIUM CHLORIDE 9 MG/ML
2000 INJECTION, SOLUTION INTRAVENOUS ONCE
Refills: 0 | Status: COMPLETED | OUTPATIENT
Start: 2022-12-20 | End: 2022-12-20

## 2022-12-20 RX ORDER — PROCHLORPERAZINE MALEATE 5 MG
25 TABLET ORAL EVERY 12 HOURS
Refills: 0 | Status: DISCONTINUED | OUTPATIENT
Start: 2022-12-20 | End: 2022-12-23

## 2022-12-20 RX ORDER — SUCRALFATE 1 G
1 TABLET ORAL
Refills: 0 | Status: DISCONTINUED | OUTPATIENT
Start: 2022-12-20 | End: 2022-12-23

## 2022-12-20 RX ORDER — FAMOTIDINE 10 MG/ML
20 INJECTION INTRAVENOUS EVERY 12 HOURS
Refills: 0 | Status: DISCONTINUED | OUTPATIENT
Start: 2022-12-20 | End: 2022-12-23

## 2022-12-20 RX ORDER — PYRIDOXINE HCL (VITAMIN B6) 100 MG
25 TABLET ORAL ONCE
Refills: 0 | Status: COMPLETED | OUTPATIENT
Start: 2022-12-20 | End: 2022-12-20

## 2022-12-20 RX ORDER — PYRIDOXINE HCL (VITAMIN B6) 100 MG
25 TABLET ORAL
Refills: 0 | Status: DISCONTINUED | OUTPATIENT
Start: 2022-12-20 | End: 2022-12-22

## 2022-12-20 RX ORDER — ONDANSETRON 8 MG/1
4 TABLET, FILM COATED ORAL EVERY 6 HOURS
Refills: 0 | Status: DISCONTINUED | OUTPATIENT
Start: 2022-12-20 | End: 2022-12-23

## 2022-12-20 RX ADMIN — Medication 10 MILLIGRAM(S): at 17:45

## 2022-12-20 RX ADMIN — SODIUM CHLORIDE 2000 MILLILITER(S): 9 INJECTION, SOLUTION INTRAVENOUS at 18:23

## 2022-12-20 RX ADMIN — FAMOTIDINE 40 MILLIGRAM(S): 10 INJECTION INTRAVENOUS at 17:45

## 2022-12-20 RX ADMIN — Medication 100 MILLIGRAM(S): at 19:54

## 2022-12-20 RX ADMIN — Medication 25 MILLIGRAM(S): at 18:23

## 2022-12-20 NOTE — ED PROVIDER NOTE - PLAN OF CARE
r/o starvation ketoacidosis  labs, imaging to r/o penumomediastinum, ekg, urine  trial of fluids, antiemetic, reassess

## 2022-12-20 NOTE — ED ADULT NURSE NOTE - OBJECTIVE STATEMENT
Pt a&ox3, in ED for poor appetite & PO intake x 1 week, loss 25 pounds, nausea with multiple episodes of vomiting, CP, and palpitations, no SOB, unlabored breathing, equal rise & fall, pt currently pregnant, denies PMH/PSH,

## 2022-12-20 NOTE — ED PROVIDER NOTE - CARE PLAN
1 Principal Discharge DX:	Hyperemesis gravidarum  Secondary Diagnosis:	Starvation ketoacidosis   Principal Discharge DX:	Hyperemesis gravidarum  Assessment and plan of treatment:	r/o starvation ketoacidosis  labs, imaging to r/o penumomediastinum, ekg, urine  trial of fluids, antiemetic, reassess  Secondary Diagnosis:	Starvation ketoacidosis

## 2022-12-20 NOTE — H&P ADULT - HISTORY OF PRESENT ILLNESS
Chief Complaint: nausea/vomiting    HPI: 31 yo  @9w1d, XAVIER 23 dated by first trimester sonogram, presented to the ED for persistent nausea and vomiting for the last 3 weeks. Patient reports she has been unable to tolerate PO food or water. Reports approximately 10 episodes of vomiting daily, typically clear or light yellow in color. Patient was taking 4mg Zofran q6 hours at home, would occasionally tolerate and vomit 1 hour later. Reports the last week she has had occasional blood streaked vomitus. Denies coffee ground emesis. Reports progressively increased weakness and lightheadedness. Reports occasional shortness of breath with ambulation. Denies lower extremity swelling, pain, erythema. Denies abdominal pain, cramping, vaginal bleeding. Reports constipation, last bowel movement 11 days ago. Prepregnancy patient was 250lbs and now reports weight of 227lbs.     Ob/Gyn History:  ) FT NSVDx1 G2) fetal demise at 38 weeks, C/S G3) FT C/S @37 weeks  eTOPx3, d&c x3  History of PCOS. Denies history of ovarian cysts, uterine fibroids, abnormal paps, or STIs

## 2022-12-20 NOTE — H&P ADULT - NSHPPHYSICALEXAM_GEN_ALL_CORE
Vital Signs Last 24 Hrs  T(C): 36.1 (20 Dec 2022 14:34), Max: 36.1 (20 Dec 2022 14:34)  T(F): 96.9 (20 Dec 2022 14:34), Max: 96.9 (20 Dec 2022 14:34)  HR: 76 (20 Dec 2022 17:53) (76 - 117)  BP: 118/67 (20 Dec 2022 17:53) (113/82 - 118/67)  BP(mean): --  RR: 18 (20 Dec 2022 17:53) (18 - 18)  SpO2: 97% (20 Dec 2022 17:53) (97% - 97%)    Parameters below as of 20 Dec 2022 17:53  Patient On (Oxygen Delivery Method): room air    General Appearance - AAOx3, NAD  Heart - S1S2 regular rate and rhythm  Lung - CTA Bilaterally  Abdomen - Soft, nontender, nondistended, no rebound, no rigidity, no guarding, bowel sounds present  BSS by ED: FH 183bpm    GYN/Pelvis: deferred

## 2022-12-20 NOTE — ED PROVIDER NOTE - CLINICAL SUMMARY MEDICAL DECISION MAKING FREE TEXT BOX
Patient with hyperemesis gravidarum p/w vomiting. Labs reviewed and significant for moderate dehydration with signs of ketosis. CXR without pneumo-mediastinum. ekg NSR. Gynecology consulted, patient will be admitted for further management.

## 2022-12-20 NOTE — ED PROVIDER NOTE - OBJECTIVE STATEMENT
32-year-old female G4, P2 at 9 weeks gestation via last menstrual period presents with nausea vomiting and inability to tolerate p.o. for the past 3 weeks worsening the past 11 days.  Patient states that she has had a 29 pound weight loss in the past month.  Patient states that during prior pregnancy she was diagnosed with hyperemesis gravidarum but this episode of nausea vomiting is more severe.  Patient also reports very dark urine, epigastric abdominal pain and burning.

## 2022-12-20 NOTE — H&P ADULT - NSHPLABSRESULTS_GEN_ALL_CORE
LABS:                          16.1   8.98  )-----------( 282      ( 20 Dec 2022 17:41 )             45.6     12-20    136  |  100  |  7<L>  ----------------------------<  82  4.2   |  16<L>  |  0.7    Ca    10.0      20 Dec 2022 17:41    TPro  8.0  /  Alb  4.8  /  TBili  1.4<H>  /  DBili  x   /  AST  29  /  ALT  45<H>  /  AlkPhos  79  12-20    LIVER FUNCTIONS - ( 20 Dec 2022 17:41 )  Alb: 4.8 g/dL / Pro: 8.0 g/dL / ALK PHOS: 79 U/L / ALT: 45 U/L / AST: 29 U/L / GGT: x

## 2022-12-20 NOTE — ED PROVIDER NOTE - NS ED ROS FT
Constitutional:  No fevers or chills.  Eyes:  No visual changes, eye pain, or discharge.  ENT:  No hearing changes. No sore throat.  Neck:  No neck pain or stiffness.  Cardiac:  No CP or edema.  Resp:  No cough or SOB. No hemoptysis.   GI:  (+) nausea, (+)  vomiting, no diarrhea, (+) abdominal pain.  :  No dysuria, frequency, or hematuria.  MSK:  No myalgias or joint pain/swelling.  Neuro:  No headache, dizziness, or weakness.  Skin:  No skin rash.

## 2022-12-20 NOTE — ED PROCEDURE NOTE - PROCEDURE ADDITIONAL DETAILS
, + fetal movements, Gestational Age by [place] Rump length 9 weeks  I was physically present and helped performed this Ultrasound.  I supervised all views obtained and reviewed images in real time. I agree with findings documented. Results of Ultrasound discussed with patient.  I personally supervised the study.  I reviewed the images and have edited where appropriate.

## 2022-12-20 NOTE — ED PROVIDER NOTE - ATTENDING CONTRIBUTION TO CARE
33 yo F , approx 9 weeks gestation suffering from hyperemesis gravidarum p/w vomiting. endorses epigastric abd cramping x 3 weeks associated with approx 20 episodes of vomitus occasionally with blood streaks. no fevers. endorses exertional fatigue, excessive weight loss. already on zofran without relief in symptoms. denies chest pain. denies vaginal discharge or bleeding.     vss, nontoxic, well appearing, pink conj, anicteric, dry mucous membranes, neck supple, CTAB, tachycardic- regular, equal radial pulses bilat, abd soft/nt/nd, no cva tend. no calves tend, no edema, no fnd. no rashes.

## 2022-12-20 NOTE — ED PROVIDER NOTE - PHYSICAL EXAMINATION
PHYSICAL EXAM: I have reviewed current vital signs.  GENERAL: NAD, well-nourished; well-developed.  HEAD:  Normocephalic, atraumatic.  EYES: EOMI, PERRL, conjunctiva and sclera clear.  ENT: MMM, no erythema/exudates.  NECK: Supple, no JVD.  CHEST/LUNG: Clear to auscultation bilaterally; no wheezes, rales, or rhonchi.  HEART: Regular rate and rhythm, normal S1 and S2; no murmurs, rubs, or gallops.  ABDOMEN: Soft, epigastric abd tenderness, nondistended.  EXTREMITIES:  2+ peripheral pulses; no clubbing, cyanosis, or edema.  PSYCH: Cooperative, appropriate, normal mood and affect.  NEUROLOGY: A&O x 3. Motor 5/5. Sensory intact. No focal neurological deficits. CN II - XII intact. (-) dysmetria, facial droop, pronator drift.  SKIN: Warm and dry.

## 2022-12-20 NOTE — ED ADULT NURSE NOTE - CHIEF COMPLAINT QUOTE
pt states she is 9 weeks pregnant and has lost 29 lbs over past 3 weeks c/o dizziness - n/v  - nicole/sob at times.

## 2022-12-20 NOTE — H&P ADULT - ASSESSMENT
33 yo  @9w1d, with intractable nausea and vomiting, unable to tolerate PO, with significant weight loss, admitted for inpatient management of hyperemesis gravidarum  -admit to OBGYN  -multivitamin bag in AM followed by LR for maintenance  -antiemetic regimen: B6, zofran, sucralfate, compazine, pepcid  -PNV  -senna   -ambulate as tolerate  -SCDs for VTE prophylaxis   -regular diet  -daily weights  -nutrition consult   -AM labs ordered  -f/u UA, urine culture, chest xray     Dr De La Cruz and Dr Prakash aware

## 2022-12-21 LAB
ALBUMIN SERPL ELPH-MCNC: 3.8 G/DL — SIGNIFICANT CHANGE UP (ref 3.5–5.2)
ALP SERPL-CCNC: 57 U/L — SIGNIFICANT CHANGE UP (ref 30–115)
ALT FLD-CCNC: 31 U/L — SIGNIFICANT CHANGE UP (ref 0–41)
ANION GAP SERPL CALC-SCNC: 14 MMOL/L — SIGNIFICANT CHANGE UP (ref 7–14)
AST SERPL-CCNC: 19 U/L — SIGNIFICANT CHANGE UP (ref 0–41)
B-OH-BUTYR SERPL-SCNC: 2.3 MMOL/L — HIGH
BASOPHILS # BLD AUTO: 0.02 K/UL — SIGNIFICANT CHANGE UP (ref 0–0.2)
BASOPHILS NFR BLD AUTO: 0.4 % — SIGNIFICANT CHANGE UP (ref 0–1)
BILIRUB SERPL-MCNC: 1.3 MG/DL — HIGH (ref 0.2–1.2)
BLD GP AB SCN SERPL QL: SIGNIFICANT CHANGE UP
BUN SERPL-MCNC: 5 MG/DL — LOW (ref 10–20)
CALCIUM SERPL-MCNC: 9.1 MG/DL — SIGNIFICANT CHANGE UP (ref 8.4–10.5)
CHLORIDE SERPL-SCNC: 103 MMOL/L — SIGNIFICANT CHANGE UP (ref 98–110)
CO2 SERPL-SCNC: 18 MMOL/L — SIGNIFICANT CHANGE UP (ref 17–32)
CREAT SERPL-MCNC: 0.6 MG/DL — LOW (ref 0.7–1.5)
EGFR: 121 ML/MIN/1.73M2 — SIGNIFICANT CHANGE UP
EOSINOPHIL # BLD AUTO: 0.06 K/UL — SIGNIFICANT CHANGE UP (ref 0–0.7)
EOSINOPHIL NFR BLD AUTO: 1.1 % — SIGNIFICANT CHANGE UP (ref 0–8)
GLUCOSE SERPL-MCNC: 69 MG/DL — LOW (ref 70–99)
HBV SURFACE AG SER-ACNC: SIGNIFICANT CHANGE UP
HCT VFR BLD CALC: 36.5 % — LOW (ref 37–47)
HCV AB S/CO SERPL IA: 0.05 COI — SIGNIFICANT CHANGE UP
HCV AB SERPL-IMP: SIGNIFICANT CHANGE UP
HGB BLD-MCNC: 13.5 G/DL — SIGNIFICANT CHANGE UP (ref 12–16)
HIV 1 & 2 AB SERPL IA.RAPID: SIGNIFICANT CHANGE UP
IMM GRANULOCYTES NFR BLD AUTO: 0.2 % — SIGNIFICANT CHANGE UP (ref 0.1–0.3)
LYMPHOCYTES # BLD AUTO: 1.76 K/UL — SIGNIFICANT CHANGE UP (ref 1.2–3.4)
LYMPHOCYTES # BLD AUTO: 32 % — SIGNIFICANT CHANGE UP (ref 20.5–51.1)
MAGNESIUM SERPL-MCNC: 1.7 MG/DL — LOW (ref 1.8–2.4)
MCHC RBC-ENTMCNC: 31.4 PG — HIGH (ref 27–31)
MCHC RBC-ENTMCNC: 37 G/DL — SIGNIFICANT CHANGE UP (ref 32–37)
MCV RBC AUTO: 84.9 FL — SIGNIFICANT CHANGE UP (ref 81–99)
MEV IGG SER-ACNC: 137 AU/ML — SIGNIFICANT CHANGE UP
MEV IGG+IGM SER-IMP: POSITIVE — SIGNIFICANT CHANGE UP
MONOCYTES # BLD AUTO: 0.52 K/UL — SIGNIFICANT CHANGE UP (ref 0.1–0.6)
MONOCYTES NFR BLD AUTO: 9.5 % — HIGH (ref 1.7–9.3)
MUV AB SER-ACNC: POSITIVE — SIGNIFICANT CHANGE UP
MUV IGG FLD-ACNC: >300 AU/ML — SIGNIFICANT CHANGE UP
NEUTROPHILS # BLD AUTO: 3.13 K/UL — SIGNIFICANT CHANGE UP (ref 1.4–6.5)
NEUTROPHILS NFR BLD AUTO: 56.8 % — SIGNIFICANT CHANGE UP (ref 42.2–75.2)
NRBC # BLD: 0 /100 WBCS — SIGNIFICANT CHANGE UP (ref 0–0)
PHOSPHATE SERPL-MCNC: 2.9 MG/DL — SIGNIFICANT CHANGE UP (ref 2.1–4.9)
PLATELET # BLD AUTO: 193 K/UL — SIGNIFICANT CHANGE UP (ref 130–400)
POTASSIUM SERPL-MCNC: 3.8 MMOL/L — SIGNIFICANT CHANGE UP (ref 3.5–5)
POTASSIUM SERPL-SCNC: 3.8 MMOL/L — SIGNIFICANT CHANGE UP (ref 3.5–5)
PROT SERPL-MCNC: 6 G/DL — SIGNIFICANT CHANGE UP (ref 6–8)
RBC # BLD: 4.3 M/UL — SIGNIFICANT CHANGE UP (ref 4.2–5.4)
RBC # FLD: 12.1 % — SIGNIFICANT CHANGE UP (ref 11.5–14.5)
RUBV IGG SER-ACNC: 11.3 INDEX — SIGNIFICANT CHANGE UP
RUBV IGG SER-IMP: POSITIVE — SIGNIFICANT CHANGE UP
SODIUM SERPL-SCNC: 135 MMOL/L — SIGNIFICANT CHANGE UP (ref 135–146)
T PALLIDUM AB TITR SER: NEGATIVE — SIGNIFICANT CHANGE UP
VZV IGG FLD QL IA: 1503 INDEX — SIGNIFICANT CHANGE UP
VZV IGG FLD QL IA: POSITIVE — SIGNIFICANT CHANGE UP
WBC # BLD: 5.5 K/UL — SIGNIFICANT CHANGE UP (ref 4.8–10.8)
WBC # FLD AUTO: 5.5 K/UL — SIGNIFICANT CHANGE UP (ref 4.8–10.8)

## 2022-12-21 PROCEDURE — 99221 1ST HOSP IP/OBS SF/LOW 40: CPT

## 2022-12-21 RX ORDER — INFLUENZA VIRUS VACCINE 15; 15; 15; 15 UG/.5ML; UG/.5ML; UG/.5ML; UG/.5ML
0.5 SUSPENSION INTRAMUSCULAR ONCE
Refills: 0 | Status: DISCONTINUED | OUTPATIENT
Start: 2022-12-21 | End: 2022-12-23

## 2022-12-21 RX ORDER — ACETAMINOPHEN 500 MG
650 TABLET ORAL EVERY 6 HOURS
Refills: 0 | Status: DISCONTINUED | OUTPATIENT
Start: 2022-12-21 | End: 2022-12-23

## 2022-12-21 RX ORDER — MAGNESIUM SULFATE 500 MG/ML
2 VIAL (ML) INJECTION ONCE
Refills: 0 | Status: COMPLETED | OUTPATIENT
Start: 2022-12-21 | End: 2022-12-21

## 2022-12-21 RX ORDER — POLYETHYLENE GLYCOL 3350 17 G/17G
17 POWDER, FOR SOLUTION ORAL DAILY
Refills: 0 | Status: DISCONTINUED | OUTPATIENT
Start: 2022-12-21 | End: 2022-12-22

## 2022-12-21 RX ADMIN — Medication 25 GRAM(S): at 17:22

## 2022-12-21 RX ADMIN — Medication 650 MILLIGRAM(S): at 11:05

## 2022-12-21 RX ADMIN — SODIUM CHLORIDE 125 MILLILITER(S): 9 INJECTION, SOLUTION INTRAVENOUS at 14:38

## 2022-12-21 RX ADMIN — ONDANSETRON 4 MILLIGRAM(S): 8 TABLET, FILM COATED ORAL at 00:01

## 2022-12-21 RX ADMIN — Medication 1 GRAM(S): at 06:00

## 2022-12-21 RX ADMIN — Medication 1 TABLET(S): at 14:38

## 2022-12-21 RX ADMIN — ONDANSETRON 4 MILLIGRAM(S): 8 TABLET, FILM COATED ORAL at 23:15

## 2022-12-21 RX ADMIN — FAMOTIDINE 20 MILLIGRAM(S): 10 INJECTION INTRAVENOUS at 18:08

## 2022-12-21 RX ADMIN — FAMOTIDINE 20 MILLIGRAM(S): 10 INJECTION INTRAVENOUS at 06:01

## 2022-12-21 RX ADMIN — Medication 25 MILLIGRAM(S): at 15:12

## 2022-12-21 RX ADMIN — ONDANSETRON 4 MILLIGRAM(S): 8 TABLET, FILM COATED ORAL at 13:18

## 2022-12-21 RX ADMIN — Medication 25 MILLIGRAM(S): at 22:26

## 2022-12-21 RX ADMIN — ONDANSETRON 4 MILLIGRAM(S): 8 TABLET, FILM COATED ORAL at 17:20

## 2022-12-21 RX ADMIN — ONDANSETRON 4 MILLIGRAM(S): 8 TABLET, FILM COATED ORAL at 06:00

## 2022-12-21 RX ADMIN — Medication 25 MILLIGRAM(S): at 06:00

## 2022-12-21 RX ADMIN — SODIUM CHLORIDE 125 MILLILITER(S): 9 INJECTION, SOLUTION INTRAVENOUS at 02:39

## 2022-12-21 RX ADMIN — SENNA PLUS 2 TABLET(S): 8.6 TABLET ORAL at 06:00

## 2022-12-21 NOTE — CONSULT NOTE ADULT - ASSESSMENT
33 yo  @9w2d, admitted for inpatient management of hyperemesis gravidarum    - MV infusion rarely needed  - B6 should be 25 mg po three times a day; unsure of source of IV push dose ordered  - d/w pt to attempt to eat only dry salty carbs and salty or bitter - but not sweet - fluids  - discussed importance, eventually of adequate protein intake  - unsure of documented weight - requested measured height and weight  - check baseline triglyceride level  - consider starting PPN tomorrow - might benefit from Midline catheter placement  - if PPN given, will include Pepcid and B6 in it  - check a1c, 25oh vit D levels  - d/c mirlax and po MV as pt can not tolerate them at this time

## 2022-12-21 NOTE — PROGRESS NOTE ADULT - SUBJECTIVE AND OBJECTIVE BOX
Chief Complaint: nausea and vomiting of pregnancy    HPI: Patient seen and assessed at bedside, reports she feels somewhat better but mostly the same. Continued nausea, with last episode of emesis yesterday afternoon. She has not attempted PO since admission besides sips of water given with medications. Does feel that the IV zofran is more effective than her home PO regimen. Last void yesterday PM, last BM 11 days ago (denies feeling of needing to have BM, reports she has not eaten in the last few weeks). She denies ambulation since admission 2/2 fatigue. She denies HA, dizziness, chest pain, SOB, RUQ/epigastric pain, abdminal pain, pelvic cramping, vaginal bleeding or abnorml disharge, or urinary sxs.     PUQE score 10    ROS: Denies cardiovascular or respiratory symptoms    PAST MEDICAL & SURGICAL HISTORY:  No pertinent past medical history     delivery delivered  H/O dilation and curettage    Physical Exam  Vital Signs Last 24 Hrs  T(F): 97.6 (21 Dec 2022 05:00), Max: 98.4 (21 Dec 2022 01:50)  HR: 80 (21 Dec 2022 05:00) (76 - 117)  BP: 106/58 (21 Dec 2022 05:00) (106/58 - 118/72)  RR: 18 (21 Dec 2022 05:00) (18 - 18)    Physical exam:  General - AAOx3, NAD  Heart - S1S2, RRR  Lungs - CTA BL  Abdomen:  - Soft, nontender, nondistended, BS+  Pelvis/Vagina - deferred  Extremities - No calf tenderness, no swelling    Labs:                        16.1   8.98  )-----------( 282      ( 20 Dec 2022 17:41 )             45.6     136  |  100  |  7  ----------------------------<82  4.2  |  16  |  0.7          Antibody Screen: NEG (22 @ 21:32)

## 2022-12-21 NOTE — ED ADULT NURSE REASSESSMENT NOTE - NS ED NURSE REASSESS COMMENT FT1
IV fluids not received from pharmacy, endorsed to receiving RN. [facility]  Patient in stable condition and nad.

## 2022-12-21 NOTE — PROGRESS NOTE ADULT - ASSESSMENT
31 yo  @9w2d, admitted for inpatient management of hyperemesis gravidarum,   -multivitamin bag in AM followed by LR for maintenance  -antiemetic regimen: B6, zofran, sucralfate, compazine, pepcid  -PNV  -senna   -ambulate as tolerated  -SCDs for VTE prophylaxis   -regular diet  -daily weights  -monitor emesis volume  -nutrition consult   -AM labs ordered - f/u results  -f/u urine culture, chest xray     Dr Santacruz and Dr. Prakash to be aware  33 yo  @9w2d, admitted for inpatient management of hyperemesis gravidarum,   -multivitamin bag in AM followed by LR for maintenance  -antiemetic regimen: B6, zofran, sucralfate, compazine, pepcid  -PNV  -senna   -ambulate as tolerated  -SCDs for VTE prophylaxis   -regular diet  -daily weights  -monitor emesis volume  -nutrition consult   -AM labs ordered - f/u results  -f/u urine culture, chest xray     Dr Santacruz and Dr. Prakash to be aware. Discussed with Dr. Nelson

## 2022-12-21 NOTE — PROGRESS NOTE ADULT - ATTENDING COMMENTS
Pt seen at bedside on 3A  States she is feeling slightly better since last dose of Zofran.    Agree with physical exam as above  Plan:  Continue IV fluids  Continue all anti-emetics  Clears as tolerated

## 2022-12-21 NOTE — PATIENT PROFILE ADULT - FALL HARM RISK - HARM RISK INTERVENTIONS

## 2022-12-21 NOTE — CONSULT NOTE ADULT - SUBJECTIVE AND OBJECTIVE BOX
NUTRITION SUPPORT TEAM  -  CONSULT NOTE --------------  preliminary note    ADMISSION HPI:  Chief Complaint: nausea/vomiting    HPI: 31 yo  @9w1d, XAVIER 23 dated by first trimester sonogram, presented to the ED for persistent nausea and vomiting for the last 3 weeks. Patient reports she has been unable to tolerate PO food or water. Reports approximately 10 episodes of vomiting daily, typically clear or light yellow in color. Patient was taking 4mg Zofran q6 hours at home, would occasionally tolerate and vomit 1 hour later. Reports the last week she has had occasional blood streaked vomitus. Denies coffee ground emesis. Reports progressively increased weakness and lightheadedness. Reports occasional shortness of breath with ambulation. Denies lower extremity swelling, pain, erythema. Denies abdominal pain, cramping, vaginal bleeding. Reports constipation, last bowel movement 11 days ago. Prepregnancy patient was 250lbs and now reports weight of 227lbs.     Ob/Gyn History:  ) FT NSVDx1 G2) fetal demise at 38 weeks, C/S G3) FT C/S @37 weeks  eTOPx3, d&c x3  History of PCOS. Denies history of ovarian cysts, uterine fibroids, abnormal paps, or STIs   (20 Dec 2022 19:20)    NUTRITION SUPPORT NOTE:      REVIEW OF SYSTEMS:  Negative except as noted above.     PAST MEDICAL/SURGICAL HISTORY:   No pertinent past medical history   delivery delivered  H/O dilation and curettage    ALLERGIES:  No Known Allergies    VITALS:  T(F): 97.5 ( @ 14:28), Max: 97.5 ( @ 14:28)  HR: 79 ( @ 14:28) (79 - 79)  BP: 103/57 (- @ 14:28) (103/57 - 103/57)  RR: 18 (- @ 14:28) (18 - 18)  SpO2: --    HEIGHT/WEIGHT/BMI:     Weight (kg): 103 (12-20)--??    I/Os: ??    PHYSICAL EXAM:   GENERAL: NAD, well-groomed, well-developed  HEENT: Moist mucous membranes, Good dentition, No lesions  ABDOMEN: Soft, Nontender, Nondistended  EXTREMITIES:  No clubbing, cyanosis, or edema  SKIN: warm and well perfused; No obvious rashes or lesions  IV ACCESS:   ENTERAL ACCESS:     STANDING MEDICATIONS:   famotidine Injectable 20 milliGRAM(s) IV Push every 12 hours  influenza   Vaccine 0.5 milliLiter(s) IntraMuscular once  lactated ringers. 1000 milliLiter(s) IV Continuous <Continuous>  ondansetron Injectable 4 milliGRAM(s) IV Push every 6 hours  polyethylene glycol 3350 17 Gram(s) Oral daily  prenatal multivitamin 1 Tablet(s) Oral daily  prochlorperazine Suppository 25 milliGRAM(s) Rectal every 12 hours  pyridoxine Injectable 25 milliGRAM(s) IV Push <User Schedule>  senna 2 Tablet(s) Oral at bedtime  sodium chloride 0.9% 1000 milliLiter(s) IV Continuous <Continuous>  sucralfate 1 Gram(s) Oral <User Schedule>    LABS:                         13.5   5.50  )-----------( 193      ( 21 Dec 2022 09:22 )             36.5     135  |  103  |  5<L>  ----------------------------<  69<L>          (22 @ 09:22)  3.8   |  18  |  0.6<L>    Ca    9.1          (22 @ 09:22)  Phos  2.9         (22 @ 09:22)  Mg     1.7         (22 @ 09:22)    TPro  6.0  /  Alb  3.8  /  TBili  1.3<H>  /  DBili  x   /  AST  19  /  ALT  31  /  AlkPhos  57       22 @ 09:22      Triglycerides, Serum:     Vitamin D, 25-Hydroxy:     A1c:     Blood Glucose (Past 24 hours):      DIET:   Diet, NPO (22 @ 18:26) [Active]       NUTRITION SUPPORT TEAM  -  CONSULT NOTE     ADMISSION HPI:  Chief Complaint: nausea/vomiting    HPI: 33 yo  @9w1d, XAVIER 23 dated by first trimester sonogram, presented to the ED for persistent nausea and vomiting for the last 3 weeks. Patient reports she has been unable to tolerate PO food or water. Reports approximately 10 episodes of vomiting daily, typically clear or light yellow in color. Patient was taking 4mg Zofran q6 hours at home, would occasionally tolerate and vomit 1 hour later. Reports the last week she has had occasional blood streaked vomitus. Denies coffee ground emesis. Reports progressively increased weakness and lightheadedness. Reports occasional shortness of breath with ambulation. Denies lower extremity swelling, pain, erythema. Denies abdominal pain, cramping, vaginal bleeding. Reports constipation, last bowel movement 11 days ago. Prepregnancy patient was 250lbs and now reports weight of 227lbs.     Ob/Gyn History:  ) FT NSVDx1 G2) fetal demise at 38 weeks, C/S G3) FT C/S @37 weeks  eTOPx3, d&c x3  History of PCOS. Denies history of ovarian cysts, uterine fibroids, abnormal paps, or STIs   (20 Dec 2022 19:20)    NUTRITION SUPPORT NOTE:  called to see pt for H.G. and weight loss  pt seen in amador bed, vomiting after trying to consume jello and broth earlier  pt reports some first trimester N&V with previous pregnancies, worse with 2nd one, but neither as bad as this    REVIEW OF SYSTEMS:  Negative except as noted above.     PAST MEDICAL/SURGICAL HISTORY:   No pertinent past medical history   delivery delivered  H/O dilation and curettage    ALLERGIES:  No Known Allergies    VITALS:  T(F): 97.5 (- @ 14:28), Max: 97.5 (- @ 14:28)  HR: 79 ( @ 14:28) (79 - 79)  BP: 103/57 (- @ 14:28) (103/57 - 103/57)  RR: 18 (- @ 14:28) (18 - 18)  SpO2: --    HEIGHT/WEIGHT/BMI:     Weight (kg): 103 (12-20)--??    I/Os: ??    PHYSICAL EXAM:   GENERAL: NAD, well-groomed, well-developed, crying, very miserable, ocnj pink, sclerae anicteric  HEENT: Moist mucous membranes, Good dentition, No lesions  ABDOMEN: Soft, Nontender, Nondistended, some lateral rib area soreness from vomiting, no lower abd cramping reported  EXTREMITIES:  No clubbing, cyanosis, or edema  SKIN: warm and well perfused; No obvious rashes or lesions  IV ACCESS: periph    STANDING MEDICATIONS:   famotidine Injectable 20 milliGRAM(s) IV Push every 12 hours  influenza   Vaccine 0.5 milliLiter(s) IntraMuscular once  lactated ringers. 1000 milliLiter(s) IV Continuous <Continuous>  ondansetron Injectable 4 milliGRAM(s) IV Push every 6 hours  polyethylene glycol 3350 17 Gram(s) Oral daily  prenatal multivitamin 1 Tablet(s) Oral daily  prochlorperazine Suppository 25 milliGRAM(s) Rectal every 12 hours  pyridoxine Injectable 25 milliGRAM(s) IV Push <User Schedule>  senna 2 Tablet(s) Oral at bedtime  sodium chloride 0.9% 1000 milliLiter(s) IV Continuous <Continuous>  sucralfate 1 Gram(s) Oral <User Schedule>    LABS:                         13.5   5.50  )-----------( 193      ( 21 Dec 2022 09:22 )             36.5     135  |  103  |  5<L>  ----------------------------<  69<L>          (22 @ 09:22)  3.8   |  18  |  0.6<L>    Ca    9.1          (22 @ 09:22)  Phos  2.9         (22 @ 09:22)  Mg     1.7         (22 @ 09:22)    TPro  6.0  /  Alb  3.8  /  TBili  1.3<H>  /  DBili  x   /  AST  19  /  ALT  31  /  AlkPhos  57       22 @ 09:22      Triglycerides, Serum:     Vitamin D, 25-Hydroxy:     A1c:     Blood Glucose (Past 24 hours):      DIET:   Diet, NPO (22 @ 18:26) [Active]

## 2022-12-22 LAB
24R-OH-CALCIDIOL SERPL-MCNC: 24 NG/ML — LOW (ref 30–80)
A1C WITH ESTIMATED AVERAGE GLUCOSE RESULT: 4.9 % — SIGNIFICANT CHANGE UP (ref 4–5.6)
ALBUMIN SERPL ELPH-MCNC: 4 G/DL — SIGNIFICANT CHANGE UP (ref 3.5–5.2)
ALP SERPL-CCNC: 55 U/L — SIGNIFICANT CHANGE UP (ref 30–115)
ALT FLD-CCNC: 29 U/L — SIGNIFICANT CHANGE UP (ref 0–41)
ANION GAP SERPL CALC-SCNC: 12 MMOL/L — SIGNIFICANT CHANGE UP (ref 7–14)
AST SERPL-CCNC: 18 U/L — SIGNIFICANT CHANGE UP (ref 0–41)
BASOPHILS # BLD AUTO: 0.02 K/UL — SIGNIFICANT CHANGE UP (ref 0–0.2)
BASOPHILS NFR BLD AUTO: 0.4 % — SIGNIFICANT CHANGE UP (ref 0–1)
BILIRUB SERPL-MCNC: 1.6 MG/DL — HIGH (ref 0.2–1.2)
BUN SERPL-MCNC: 4 MG/DL — LOW (ref 10–20)
CALCIUM SERPL-MCNC: 8.5 MG/DL — SIGNIFICANT CHANGE UP (ref 8.4–10.5)
CHLORIDE SERPL-SCNC: 106 MMOL/L — SIGNIFICANT CHANGE UP (ref 98–110)
CO2 SERPL-SCNC: 19 MMOL/L — SIGNIFICANT CHANGE UP (ref 17–32)
CREAT SERPL-MCNC: 0.6 MG/DL — LOW (ref 0.7–1.5)
CULTURE RESULTS: SIGNIFICANT CHANGE UP
EGFR: 121 ML/MIN/1.73M2 — SIGNIFICANT CHANGE UP
EOSINOPHIL # BLD AUTO: 0.08 K/UL — SIGNIFICANT CHANGE UP (ref 0–0.7)
EOSINOPHIL NFR BLD AUTO: 1.6 % — SIGNIFICANT CHANGE UP (ref 0–8)
ESTIMATED AVERAGE GLUCOSE: 94 MG/DL — SIGNIFICANT CHANGE UP (ref 68–114)
GLUCOSE SERPL-MCNC: 73 MG/DL — SIGNIFICANT CHANGE UP (ref 70–99)
HCT VFR BLD CALC: 36.6 % — LOW (ref 37–47)
HGB BLD-MCNC: 12.8 G/DL — SIGNIFICANT CHANGE UP (ref 12–16)
IMM GRANULOCYTES NFR BLD AUTO: 0.4 % — HIGH (ref 0.1–0.3)
LYMPHOCYTES # BLD AUTO: 1.49 K/UL — SIGNIFICANT CHANGE UP (ref 1.2–3.4)
LYMPHOCYTES # BLD AUTO: 30.7 % — SIGNIFICANT CHANGE UP (ref 20.5–51.1)
MAGNESIUM SERPL-MCNC: 1.7 MG/DL — LOW (ref 1.8–2.4)
MCHC RBC-ENTMCNC: 31 PG — SIGNIFICANT CHANGE UP (ref 27–31)
MCHC RBC-ENTMCNC: 35 G/DL — SIGNIFICANT CHANGE UP (ref 32–37)
MCV RBC AUTO: 88.6 FL — SIGNIFICANT CHANGE UP (ref 81–99)
MONOCYTES # BLD AUTO: 0.37 K/UL — SIGNIFICANT CHANGE UP (ref 0.1–0.6)
MONOCYTES NFR BLD AUTO: 7.6 % — SIGNIFICANT CHANGE UP (ref 1.7–9.3)
NEUTROPHILS # BLD AUTO: 2.87 K/UL — SIGNIFICANT CHANGE UP (ref 1.4–6.5)
NEUTROPHILS NFR BLD AUTO: 59.3 % — SIGNIFICANT CHANGE UP (ref 42.2–75.2)
NRBC # BLD: 0 /100 WBCS — SIGNIFICANT CHANGE UP (ref 0–0)
PHOSPHATE SERPL-MCNC: 2.2 MG/DL — SIGNIFICANT CHANGE UP (ref 2.1–4.9)
PLATELET # BLD AUTO: 186 K/UL — SIGNIFICANT CHANGE UP (ref 130–400)
POTASSIUM SERPL-MCNC: 3.9 MMOL/L — SIGNIFICANT CHANGE UP (ref 3.5–5)
POTASSIUM SERPL-SCNC: 3.9 MMOL/L — SIGNIFICANT CHANGE UP (ref 3.5–5)
PROT SERPL-MCNC: 5.8 G/DL — LOW (ref 6–8)
RBC # BLD: 4.13 M/UL — LOW (ref 4.2–5.4)
RBC # FLD: 12.1 % — SIGNIFICANT CHANGE UP (ref 11.5–14.5)
SODIUM SERPL-SCNC: 137 MMOL/L — SIGNIFICANT CHANGE UP (ref 135–146)
SPECIMEN SOURCE: SIGNIFICANT CHANGE UP
TRIGL SERPL-MCNC: 88 MG/DL — SIGNIFICANT CHANGE UP
WBC # BLD: 4.85 K/UL — SIGNIFICANT CHANGE UP (ref 4.8–10.8)
WBC # FLD AUTO: 4.85 K/UL — SIGNIFICANT CHANGE UP (ref 4.8–10.8)

## 2022-12-22 PROCEDURE — 76937 US GUIDE VASCULAR ACCESS: CPT | Mod: 26

## 2022-12-22 PROCEDURE — 36000 PLACE NEEDLE IN VEIN: CPT

## 2022-12-22 PROCEDURE — 36569 INSJ PICC 5 YR+ W/O IMAGING: CPT

## 2022-12-22 PROCEDURE — 99232 SBSQ HOSP IP/OBS MODERATE 35: CPT

## 2022-12-22 RX ORDER — I.V. FAT EMULSION 20 G/100ML
1.43 EMULSION INTRAVENOUS
Qty: 100.1 | Refills: 0 | Status: DISCONTINUED | OUTPATIENT
Start: 2022-12-22 | End: 2022-12-22

## 2022-12-22 RX ORDER — ELECTROLYTE SOLUTION,INJ
1 VIAL (ML) INTRAVENOUS
Refills: 0 | Status: DISCONTINUED | OUTPATIENT
Start: 2022-12-22 | End: 2022-12-22

## 2022-12-22 RX ORDER — MAGNESIUM SULFATE 500 MG/ML
2 VIAL (ML) INJECTION ONCE
Refills: 0 | Status: COMPLETED | OUTPATIENT
Start: 2022-12-22 | End: 2022-12-22

## 2022-12-22 RX ADMIN — Medication 25 MILLIGRAM(S): at 18:14

## 2022-12-22 RX ADMIN — ONDANSETRON 4 MILLIGRAM(S): 8 TABLET, FILM COATED ORAL at 06:39

## 2022-12-22 RX ADMIN — I.V. FAT EMULSION 31.3 GM/KG/DAY: 20 EMULSION INTRAVENOUS at 20:44

## 2022-12-22 RX ADMIN — ONDANSETRON 4 MILLIGRAM(S): 8 TABLET, FILM COATED ORAL at 18:15

## 2022-12-22 RX ADMIN — ONDANSETRON 4 MILLIGRAM(S): 8 TABLET, FILM COATED ORAL at 13:30

## 2022-12-22 RX ADMIN — Medication 25 MILLIGRAM(S): at 06:38

## 2022-12-22 RX ADMIN — Medication 1 EACH: at 20:44

## 2022-12-22 RX ADMIN — FAMOTIDINE 20 MILLIGRAM(S): 10 INJECTION INTRAVENOUS at 06:37

## 2022-12-22 RX ADMIN — SODIUM CHLORIDE 125 MILLILITER(S): 9 INJECTION, SOLUTION INTRAVENOUS at 06:37

## 2022-12-22 RX ADMIN — FAMOTIDINE 20 MILLIGRAM(S): 10 INJECTION INTRAVENOUS at 19:19

## 2022-12-22 RX ADMIN — Medication 25 GRAM(S): at 13:30

## 2022-12-22 NOTE — PROGRESS NOTE ADULT - SUBJECTIVE AND OBJECTIVE BOX
Chief Complaint: nausea and vomiting of pregnancy    HPI: Patient seen and assessed at bedside, reports her nausea is under control except when she attempts to eat. Ate crackers and jello yesterday which she states she immediately threw up. No other episodes of emesis. Voided yesterday, continues to deny BM. She denies HA, dizziness, chest pain, SOB, RUQ/epigastric pain, abdminal pain, pelvic cramping, vaginal bleeding or abnorml disharge, or urinary sxs.     PUQE score 5    ROS: Denies cardiovascular or respiratory symptoms    PAST MEDICAL & SURGICAL HISTORY:  No pertinent past medical history     delivery delivered  H/O dilation and curettage    Physical Exam  Vital Signs Last 24 Hrs  T(C): 36.7 (21 Dec 2022 21:50), Max: 36.7 (21 Dec 2022 21:50)  T(F): 98 (21 Dec 2022 21:50), Max: 98 (21 Dec 2022 21:50)  HR: 79 (21 Dec 2022 21:50) (79 - 79)  BP: 111/62 (21 Dec 2022 21:50) (103/57 - 111/62)  RR: 19 (21 Dec 2022 21:50) (18 - 19)  SpO2: 97% (21 Dec 2022 21:50) (97% - 97%)    O2 Parameters below as of 21 Dec 2022 21:50  Patient On (Oxygen Delivery Method): room air    Physical exam:  General - AAOx3, NAD  Heart - S1S2, RRR  Lungs - CTA BL  Abdomen:  - Soft, nontender, nondistended, BS+  Pelvis/Vagina - deferred  Extremities - No calf tenderness, no swelling    Labs:                        16.1   8.98  )-----------( 282      ( 20 Dec 2022 17:41 )             45.6     136  |  100  |  7  ----------------------------<82  4.2  |  16  |  0.7          Antibody Screen: NEG (22 @ 21:32)

## 2022-12-22 NOTE — PROGRESS NOTE ADULT - ASSESSMENT
33 yo  @9w3d, admitted for inpatient management of hyperemesis gravidarum,   -multivitamin bag in AM followed by LR for maintenance  -antiemetic regimen: B6, zofran, sucralfate, compazine, pepcid  -PNV and miralax discontinued for now  -senna   -ambulate as tolerated  -SCDs for VTE prophylaxis   -daily weights  -monitor emesis volume  -nutrition consult - possible PPN, check baseline A1C, vit D, and triglycerides  -AM labs ordered - f/u results  -f/u urine culture  -encouraged attempts at PO intake taking things slowly and gradually    Dr. Antunez and Dr. Nelson to be aware

## 2022-12-22 NOTE — PROCEDURE NOTE - ADDITIONAL PROCEDURE DETAILS
Procedure team was consulted to perform  urgent midline procedure for the diagnosis of hyperemesis gravidium. I was present for the key critical aspects of the procedure performed during the care of the patient.  Utilizing ultrasound guidance,  a midline was inserted into the left brachial vein. Prior to needle insertion, the patency of the vein was confirmed with ultrasound.  The representative images are stored on the [employer] application

## 2022-12-22 NOTE — PROGRESS NOTE ADULT - SUBJECTIVE AND OBJECTIVE BOX
NUTRITION SUPPORT TEAM  -  PROGRESS NOTE   pt seen and evaluated   still c/o vomiting after  having a cracker  this morning   abdomen gravid  c/o generalized weakness  REVIEW OF SYSTEMS:  Negative except as noted above.     VITALS:  T(F): 98.5 (12-22 @ 06:36), Max: 98.5 (12-22 @ 06:36)  HR: 81 (12-22 @ 06:36) (81 - 81)  BP: 94/58 (12-22 @ 06:36) (94/58 - 94/58)  RR: 19 (12-22 @ 06:36) (19 - 19)  SpO2: 97% (12-22 @ 06:36) (97% - 97%)    HEIGHT/WEIGHT/BMI:     12-21-22 @ 07:01  -  12-22-22 @ 07:00  --------------------------------------------------------  IN:  Total IN: 0 mL    OUT:    Emesis (mL): 0 mL  Total OUT: 0 mL  Total NET: 0 mL    STANDING MEDICATIONS:   famotidine Injectable 20 milliGRAM(s) IV Push every 12 hours  fat emulsion (Fish Oil and Plant Based) 20% Infusion 1.43 Gm/kG/Day IV Continuous <Continuous>  influenza   Vaccine 0.5 milliLiter(s) IntraMuscular once  lactated ringers. 1000 milliLiter(s) IV Continuous <Continuous>  magnesium sulfate  IVPB 2 Gram(s) IV Intermittent once  ondansetron Injectable 4 milliGRAM(s) IV Push every 6 hours  Parenteral Nutrition - Adult 1 Each TPN Continuous <Continuous>  prochlorperazine Suppository 25 milliGRAM(s) Rectal every 12 hours  senna 2 Tablet(s) Oral at bedtime  sodium chloride 0.9% 1000 milliLiter(s) IV Continuous <Continuous>  sucralfate 1 Gram(s) Oral <User Schedule>      LABS:                         12.8   4.85  )-----------( 186      ( 22 Dec 2022 04:30 )             36.6     137  |  106  |  4<L>  ----------------------------<  73          (12-22-22 @ 04:30)  3.9   |  19  |  0.6<L>    Ca    8.5          (12-22-22 @ 04:30)  Phos  2.2         (12-22-22 @ 04:30)  Mg     1.7         (12-22-22 @ 04:30)  TPro  5.8<L>  /  Alb  4.0  /  TBili  1.6<H>  /  DBili  x   /  AST  18  /  ALT  29  /  AlkPhos  55       12-22-22 @ 04:30  Triglycerides, Serum: 88 mg/dL (12-22 @ 04:30)    DIET:   Diet, NPO (12-21-22 @ 18:26) [Active]      fat emulsion (Fish Oil and Plant Based) 20% Infusion 1.43 Gm/kG/Day (31.3 mL/Hr) IV Continuous <Continuous>, 12-22-22 @ 20:00, 20:00, Stop order after: 16 Hours  Parenteral Nutrition - Adult 1 Each (70 mL/Hr) TPN Continuous <Continuous>, 12-22-22 @ 20:00, 20:00, Stop order after: 1 Days      RADIOLOGY:

## 2022-12-22 NOTE — PROGRESS NOTE ADULT - ASSESSMENT
ASSESSMENT   33 yo  @9w2d, admitted for inpatient management of hyperemesis gravidarum  hypomagnesemia    PPN ordered for tonight  when ppn start tonight please d/c all other IV fluid-   might benefit from Midline catheter placement  spoke with OB team  - check baseline triglyceride level  -- check a1c, 25oh vit D levels

## 2022-12-22 NOTE — PROGRESS NOTE ADULT - ATTENDING COMMENTS
Pt seen at bedside for 45 minutes.  Agree with all above  -F/U labs today  -Nutrition input appreciated.

## 2022-12-23 ENCOUNTER — TRANSCRIPTION ENCOUNTER (OUTPATIENT)
Age: 32
End: 2022-12-23

## 2022-12-23 VITALS
RESPIRATION RATE: 18 BRPM | DIASTOLIC BLOOD PRESSURE: 53 MMHG | TEMPERATURE: 99 F | HEART RATE: 99 BPM | SYSTOLIC BLOOD PRESSURE: 98 MMHG

## 2022-12-23 LAB
ALBUMIN SERPL ELPH-MCNC: 3.8 G/DL — SIGNIFICANT CHANGE UP (ref 3.5–5.2)
ALP SERPL-CCNC: 56 U/L — SIGNIFICANT CHANGE UP (ref 30–115)
ALT FLD-CCNC: 27 U/L — SIGNIFICANT CHANGE UP (ref 0–41)
ANION GAP SERPL CALC-SCNC: 14 MMOL/L — SIGNIFICANT CHANGE UP (ref 7–14)
AST SERPL-CCNC: 16 U/L — SIGNIFICANT CHANGE UP (ref 0–41)
BASOPHILS # BLD AUTO: 0.01 K/UL — SIGNIFICANT CHANGE UP (ref 0–0.2)
BASOPHILS NFR BLD AUTO: 0.3 % — SIGNIFICANT CHANGE UP (ref 0–1)
BILIRUB SERPL-MCNC: 1.3 MG/DL — HIGH (ref 0.2–1.2)
BUN SERPL-MCNC: 4 MG/DL — LOW (ref 10–20)
CALCIUM SERPL-MCNC: 8.9 MG/DL — SIGNIFICANT CHANGE UP (ref 8.4–10.5)
CHLORIDE SERPL-SCNC: 104 MMOL/L — SIGNIFICANT CHANGE UP (ref 98–110)
CO2 SERPL-SCNC: 18 MMOL/L — SIGNIFICANT CHANGE UP (ref 17–32)
CREAT SERPL-MCNC: 0.6 MG/DL — LOW (ref 0.7–1.5)
EGFR: 121 ML/MIN/1.73M2 — SIGNIFICANT CHANGE UP
EOSINOPHIL # BLD AUTO: 0.07 K/UL — SIGNIFICANT CHANGE UP (ref 0–0.7)
EOSINOPHIL NFR BLD AUTO: 1.8 % — SIGNIFICANT CHANGE UP (ref 0–8)
GLUCOSE SERPL-MCNC: 93 MG/DL — SIGNIFICANT CHANGE UP (ref 70–99)
HCT VFR BLD CALC: 36.3 % — LOW (ref 37–47)
HGB BLD-MCNC: 12.9 G/DL — SIGNIFICANT CHANGE UP (ref 12–16)
IMM GRANULOCYTES NFR BLD AUTO: 0.3 % — SIGNIFICANT CHANGE UP (ref 0.1–0.3)
LYMPHOCYTES # BLD AUTO: 0.61 K/UL — LOW (ref 1.2–3.4)
LYMPHOCYTES # BLD AUTO: 15.4 % — LOW (ref 20.5–51.1)
MAGNESIUM SERPL-MCNC: 2.1 MG/DL — SIGNIFICANT CHANGE UP (ref 1.8–2.4)
MCHC RBC-ENTMCNC: 31.2 PG — HIGH (ref 27–31)
MCHC RBC-ENTMCNC: 35.5 G/DL — SIGNIFICANT CHANGE UP (ref 32–37)
MCV RBC AUTO: 87.9 FL — SIGNIFICANT CHANGE UP (ref 81–99)
MONOCYTES # BLD AUTO: 0.34 K/UL — SIGNIFICANT CHANGE UP (ref 0.1–0.6)
MONOCYTES NFR BLD AUTO: 8.6 % — SIGNIFICANT CHANGE UP (ref 1.7–9.3)
NEUTROPHILS # BLD AUTO: 2.91 K/UL — SIGNIFICANT CHANGE UP (ref 1.4–6.5)
NEUTROPHILS NFR BLD AUTO: 73.6 % — SIGNIFICANT CHANGE UP (ref 42.2–75.2)
NRBC # BLD: 0 /100 WBCS — SIGNIFICANT CHANGE UP (ref 0–0)
PHOSPHATE SERPL-MCNC: 2.7 MG/DL — SIGNIFICANT CHANGE UP (ref 2.1–4.9)
PLATELET # BLD AUTO: 180 K/UL — SIGNIFICANT CHANGE UP (ref 130–400)
POTASSIUM SERPL-MCNC: 4 MMOL/L — SIGNIFICANT CHANGE UP (ref 3.5–5)
POTASSIUM SERPL-SCNC: 4 MMOL/L — SIGNIFICANT CHANGE UP (ref 3.5–5)
PROT SERPL-MCNC: 6 G/DL — SIGNIFICANT CHANGE UP (ref 6–8)
RBC # BLD: 4.13 M/UL — LOW (ref 4.2–5.4)
RBC # FLD: 12 % — SIGNIFICANT CHANGE UP (ref 11.5–14.5)
SODIUM SERPL-SCNC: 136 MMOL/L — SIGNIFICANT CHANGE UP (ref 135–146)
WBC # BLD: 3.95 K/UL — LOW (ref 4.8–10.8)
WBC # FLD AUTO: 3.95 K/UL — LOW (ref 4.8–10.8)

## 2022-12-23 RX ORDER — ONDANSETRON 8 MG/1
1 TABLET, FILM COATED ORAL
Qty: 20 | Refills: 0
Start: 2022-12-23

## 2022-12-23 RX ORDER — ELECTROLYTE SOLUTION,INJ
1 VIAL (ML) INTRAVENOUS
Refills: 0 | Status: DISCONTINUED | OUTPATIENT
Start: 2022-12-23 | End: 2022-12-23

## 2022-12-23 RX ORDER — PYRIDOXINE HCL (VITAMIN B6) 100 MG
1 TABLET ORAL
Qty: 30 | Refills: 0
Start: 2022-12-23

## 2022-12-23 RX ORDER — I.V. FAT EMULSION 20 G/100ML
1.43 EMULSION INTRAVENOUS
Qty: 100.1 | Refills: 0 | Status: DISCONTINUED | OUTPATIENT
Start: 2022-12-23 | End: 2022-12-23

## 2022-12-23 RX ADMIN — ONDANSETRON 4 MILLIGRAM(S): 8 TABLET, FILM COATED ORAL at 05:14

## 2022-12-23 RX ADMIN — Medication 650 MILLIGRAM(S): at 15:23

## 2022-12-23 RX ADMIN — ONDANSETRON 4 MILLIGRAM(S): 8 TABLET, FILM COATED ORAL at 00:03

## 2022-12-23 RX ADMIN — FAMOTIDINE 20 MILLIGRAM(S): 10 INJECTION INTRAVENOUS at 05:14

## 2022-12-23 RX ADMIN — ONDANSETRON 4 MILLIGRAM(S): 8 TABLET, FILM COATED ORAL at 11:58

## 2022-12-23 NOTE — PROGRESS NOTE ADULT - ASSESSMENT
ASSESSMENT   33 yo  @9w2d, admitted for inpatient management of hyperemesis gravidarum  hypomagnesemia     ASSESSMENT   33 yo  @9w2d, admitted for inpatient management of hyperemesis gravidarum  hypomagnesemia  hypophos    - cont PPN  - Midline placed

## 2022-12-23 NOTE — PROGRESS NOTE ADULT - SUBJECTIVE AND OBJECTIVE BOX
NUTRITION SUPPORT TEAM  -  PROGRESS NOTE   pt seen and evaluated   resting comfortably   on PPN  REVIEW OF SYSTEMS:  Negative except as noted above.     VITALS:  T(F): 98.7 (12-23 @ 05:12), Max: 98.7 (12-23 @ 05:12)  HR: 85 (12-23 @ 05:12) (85 - 85)  BP: 106/59 (12-23 @ 05:12) (106/59 - 106/59)  RR: 18 (12-23 @ 05:12) (18 - 18)  SpO2: 93% (12-23 @ 05:12) (93% - 93%)    HEIGHT/WEIGHT/BMI:   Weight (kg): 109.7 (12-22)      12-22-22 @ 07:01  -  12-23-22 @ 07:00  --------------------------------------------------------  IN:    Fat Emulsion (Fish Oil &amp; Plant Based) 20% Infusion: 313 mL    PPN (Peripheral Parenteral Nutrition): 700 mL  Total IN: 1013 mL    OUT:    Emesis (mL): 0 mL  Total OUT: 0 mL  Total NET: 1013 mL    STANDING MEDICATIONS:   famotidine Injectable 20 milliGRAM(s) IV Push every 12 hours  fat emulsion (Fish Oil and Plant Based) 20% Infusion 1.43 Gm/kG/Day IV Continuous <Continuous>  influenza   Vaccine 0.5 milliLiter(s) IntraMuscular once  ondansetron Injectable 4 milliGRAM(s) IV Push every 6 hours  Parenteral Nutrition - Adult 1 Each TPN Continuous <Continuous>  prochlorperazine Suppository 25 milliGRAM(s) Rectal every 12 hours  senna 2 Tablet(s) Oral at bedtime  sucralfate 1 Gram(s) Oral <User Schedule>      LABS:                         12.9   3.95  )-----------( 180      ( 23 Dec 2022 07:52 )             36.3     137  |  106  |  4<L>  ----------------------------<  73          (12-22-22 @ 04:30)  3.9   |  19  |  0.6<L>    Ca    8.5          (12-22-22 @ 04:30)  Phos  2.2         (12-22-22 @ 04:30)  Mg     1.7         (12-22-22 @ 04:30)  TPro  5.8<L>  /  Alb  4.0  /  TBili  1.6<H>  /  DBili  x   /  AST  18  /  ALT  29  /  AlkPhos  55       12-22-22 @ 04:3  Triglycerides, Serum: 88 mg/dL  Vitamin D, 25-Hydroxy: 24 ng/mL (12-22 @ 04:30)    DIET:   Diet, Clear Liquid (12-23-22 @ 08:18) [Active]      fat emulsion (Fish Oil and Plant Based) 20% Infusion 1.43 Gm/kG/Day (31.3 mL/Hr) IV Continuous <Continuous>, 12-22-22 @ 20:00, 20:00, Stop order after: 16 Hours  Parenteral Nutrition - Adult 1 Each (70 mL/Hr) TPN Continuous <Continuous>, 12-22-22 @ 20:00, 20:00, Stop order after: 1 Days     NUTRITION SUPPORT TEAM  -  PROGRESS NOTE   pt seen and evaluated   resting comfortably, feels somewhat better, able to tolerate only crumbs of crackers but not vomiting this morning  on PPN    REVIEW OF SYSTEMS:  Negative except as noted above.     VITALS:  T(F): 98.7 (12-23 @ 05:12), Max: 98.7 (12-23 @ 05:12)  HR: 85 (12-23 @ 05:12) (85 - 85)  BP: 106/59 (12-23 @ 05:12) (106/59 - 106/59)  RR: 18 (12-23 @ 05:12) (18 - 18)  SpO2: 93% (12-23 @ 05:12) (93% - 93%)    HEIGHT/WEIGHT/BMI:   Weight (kg): 109.7 (12-22)      12-22-22 @ 07:01  -  12-23-22 @ 07:00  --------------------------------------------------------  IN:    Fat Emulsion (Fish Oil &amp; Plant Based) 20% Infusion: 313 mL    PPN (Peripheral Parenteral Nutrition): 700 mL  Total IN: 1013 mL    OUT:    Emesis (mL): 0 mL  Total OUT: 0 mL  Total NET: 1013 mL    STANDING MEDICATIONS:   famotidine Injectable 20 milliGRAM(s) IV Push every 12 hours  fat emulsion (Fish Oil and Plant Based) 20% Infusion 1.43 Gm/kG/Day IV Continuous <Continuous>  influenza   Vaccine 0.5 milliLiter(s) IntraMuscular once  ondansetron Injectable 4 milliGRAM(s) IV Push every 6 hours  Parenteral Nutrition - Adult 1 Each TPN Continuous <Continuous>  prochlorperazine Suppository 25 milliGRAM(s) Rectal every 12 hours  senna 2 Tablet(s) Oral at bedtime  sucralfate 1 Gram(s) Oral <User Schedule>      LABS:                         12.9   3.95  )-----------( 180      ( 23 Dec 2022 07:52 )             36.3     137  |  106  |  4<L>  ----------------------------<  73          (12-22-22 @ 04:30)  3.9   |  19  |  0.6<L>    Ca    8.5          (12-22-22 @ 04:30)  Phos  2.2         (12-22-22 @ 04:30)  Mg     1.7         (12-22-22 @ 04:30)  TPro  5.8<L>  /  Alb  4.0  /  TBili  1.6<H>  /  DBili  x   /  AST  18  /  ALT  29  /  AlkPhos  55       12-22-22 @ 04:3  Triglycerides, Serum: 88 mg/dL  Vitamin D, 25-Hydroxy: 24 ng/mL (12-22 @ 04:30)    DIET:   Diet, Clear Liquid (12-23-22 @ 08:18) [Active]

## 2022-12-23 NOTE — PROGRESS NOTE ADULT - SUBJECTIVE AND OBJECTIVE BOX
Chief Complaint: nausea and vomiting of pregnancy    HPI: Patient seen and assessed at bedside, reports mild nausea yesterday, but no attempts to try PO. No episodes of emesis yesterday. COntinues to void spontaneously but denies BM. She denies HA, dizziness, chest pain, SOB, RUQ/epigastric pain, abdminal pain, pelvic cramping, vaginal bleeding or abnorml disharge, or urinary sxs.     PUQE score 2    ROS: Denies cardiovascular or respiratory symptoms    PAST MEDICAL & SURGICAL HISTORY:  No pertinent past medical history     delivery delivered  H/O dilation and curettage    Physical Exam  Vital Signs Last 24 Hrs  T(C): 37.1 (23 Dec 2022 05:12), Max: 37.1 (22 Dec 2022 20:00)  T(F): 98.7 (23 Dec 2022 05:12), Max: 98.8 (22 Dec 2022 20:00)  HR: 85 (23 Dec 2022 05:12) (81 - 85)  BP: 106/59 (23 Dec 2022 05:12) (94/58 - 108/75)  RR: 18 (23 Dec 2022 05:12) (18 - 19)  SpO2: 93% (23 Dec 2022 05:12) (93% - 98%)    O2 Parameters below as of 22 Dec 2022 20:00  Patient On (Oxygen Delivery Method): room air    Physical exam:  General - AAOx3, NAD  Heart - S1S2, RRR  Lungs - CTA BL  Abdomen:  - Soft, nontender, nondistended, BS+  Pelvis/Vagina - deferred  Extremities - No calf tenderness, no swelling    Labs:                        16.1   8.98  )-----------( 282      ( 20 Dec 2022 17:41 )             45.6     136  |  100  |  7  ----------------------------<82  4.2  |  16  |  0.7          Antibody Screen: NEG (22 @ 21:32)

## 2022-12-23 NOTE — DISCHARGE NOTE ANTEPARTUM - CARE PROVIDER_API CALL
Esme Prakash)  Obstetrics and Gynecology  [facility] [place] [address]  Phone: [phone]  Fax: [phone]  Follow Up Time:

## 2022-12-23 NOTE — PROGRESS NOTE ADULT - PROVIDER SPECIALTY LIST ADULT
Kat Gomez;   Chief Complaint   Patient presents with     Urinary Problem     dysuria onset 2 days ago      Urgent Care     Initial /70 (BP Location: Right arm, Patient Position: Chair, Cuff Size: Adult Regular)  Pulse 86  Temp 97.6  F (36.4  C) (Oral)  Ht 5' (1.524 m)  Wt 175 lb (79.4 kg)  SpO2 97%  BMI 34.18 kg/m2 Estimated body mass index is 34.18 kg/(m^2) as calculated from the following:    Height as of this encounter: 5' (1.524 m).    Weight as of this encounter: 175 lb (79.4 kg)..  BP completed using cuff size regular.  Tracy Hall R.N.  
Nutrition Support
Nutrition Support
GYN

## 2022-12-23 NOTE — DISCHARGE NOTE ANTEPARTUM - HOSPITAL COURSE
31 yo  @9w1d, XAVIER 23 dated by first trimester sonogram, presented to the ED for persistent nausea and vomiting for the last 3 weeks. Patient reports she has been unable to tolerate PO food or water. Reports approximately 10 episodes of vomiting daily, typically clear or light yellow in color. Patient was taking 4mg Zofran q6 hours at home, would occasionally tolerate and vomit 1 hour later. Reports the last week she has had occasional blood streaked vomitus. Denies coffee ground emesis. Reports progressively increased weakness and lightheadedness. Reports occasional shortness of breath with ambulation. Denies lower extremity swelling, pain, erythema. Denies abdominal pain, cramping, vaginal bleeding. Reports constipation, last bowel movement 11 days ago. Prepregnancy patient was 250lbs and now reports weight of 227lbs.     Through her hospital course the patient received PPN. On hospital day 3, she was tolerating oral food and felt safe to go home. She was discharged home in a stable condition.

## 2022-12-23 NOTE — DISCHARGE NOTE ANTEPARTUM - PLAN OF CARE
Continue to try and eat as much as you can. Continue to take small bites as much as possible as you gain your appetite back. Start with bland foods that are easy for your stomach to tolerate like bananas, rice, apples, and toast. You may take the Zofran and B6 every 6 hours as prescribed. Try to alternate the two if possible.

## 2022-12-23 NOTE — DISCHARGE NOTE ANTEPARTUM - CARE PLAN
Principal Discharge DX:	Hyperemesis gravidarum  Assessment and plan of treatment:	Continue to try and eat as much as you can. Continue to take small bites as much as possible as you gain your appetite back. Start with bland foods that are easy for your stomach to tolerate like bananas, rice, apples, and toast. You may take the Zofran and B6 every 6 hours as prescribed. Try to alternate the two if possible.  Secondary Diagnosis:	Starvation ketoacidosis   1

## 2022-12-23 NOTE — DISCHARGE NOTE ANTEPARTUM - NS MD DC FALL RISK RISK
For information on Fall & Injury Prevention, visit: [email or web address] OR  [email or web address] OR  https://www.cdc.gov/steadi/patient.html

## 2022-12-23 NOTE — DISCHARGE NOTE NURSING/CASE MANAGEMENT/SOCIAL WORK - PATIENT PORTAL LINK FT
You can access the FollowMyHealth Patient Portal offered by [facility] by registering at the following website: [email or web address]. By joining [employer]’s FollowMyHealth portal, you will also be able to view your health information using other applications (apps) compatible with our system.

## 2022-12-23 NOTE — DISCHARGE NOTE ANTEPARTUM - MEDICATION SUMMARY - MEDICATIONS TO TAKE
I will START or STAY ON the medications listed below when I get home from the hospital:    ondansetron 8 mg oral tablet, disintegrating  -- 1 tab(s) by mouth every 6 hours MDD:4 tabs  -- Indication: For nausea    pyridoxine 25 mg oral tablet  -- 1 tab(s) by mouth 3 times a day MDD:3 tabs  -- Indication: For nausea

## 2022-12-23 NOTE — DISCHARGE NOTE ANTEPARTUM - PATIENT PORTAL LINK FT
You can access the FollowMyHealth Patient Portal offered by [facility] by registering at the following website: [email or web address]. By joining [employer]’s FollowMyHealth portal, you will also be able to view your health information using other applications (apps) compatible with our system.

## 2022-12-23 NOTE — PROGRESS NOTE ADULT - ASSESSMENT
33 yo  @9w4d, admitted for inpatient management of hyperemesis gravidarum,   -multivitamin bag in AM followed by LR for maintenance  -antiemetic regimen: B6, zofran, sucralfate, compazine, pepcid  -PNV and miralax discontinued for now  -senna   -ambulate as tolerated  -SCDs for VTE prophylaxis   -daily weights  -monitor emesis volume  -nutrition consult - s/p midline placement, receiving PPN  -AM labs ordered - f/u results  -f/u urine culture  -encouraged attempts at PO intake taking things slowly and gradually    Dr. Antunez and Dr. Prakash to be aware

## 2022-12-23 NOTE — PROGRESS NOTE ADULT - REASON FOR ADMISSION
hyperemesis gravidarum

## 2022-12-28 DIAGNOSIS — E83.39 OTHER DISORDERS OF PHOSPHORUS METABOLISM: ICD-10-CM

## 2022-12-28 DIAGNOSIS — Z20.822 CONTACT WITH AND (SUSPECTED) EXPOSURE TO COVID-19: ICD-10-CM

## 2022-12-28 DIAGNOSIS — E28.2 POLYCYSTIC OVARIAN SYNDROME: ICD-10-CM

## 2022-12-28 DIAGNOSIS — O34.219 MATERNAL CARE FOR UNSPECIFIED TYPE SCAR FROM PREVIOUS CESAREAN DELIVERY: ICD-10-CM

## 2022-12-28 DIAGNOSIS — O99.611 DISEASES OF THE DIGESTIVE SYSTEM COMPLICATING PREGNANCY, FIRST TRIMESTER: ICD-10-CM

## 2022-12-28 DIAGNOSIS — E83.42 HYPOMAGNESEMIA: ICD-10-CM

## 2022-12-28 DIAGNOSIS — O21.1 HYPEREMESIS GRAVIDARUM WITH METABOLIC DISTURBANCE: ICD-10-CM

## 2022-12-28 DIAGNOSIS — Z3A.09 9 WEEKS GESTATION OF PREGNANCY: ICD-10-CM

## 2022-12-28 DIAGNOSIS — Z28.21 IMMUNIZATION NOT CARRIED OUT BECAUSE OF PATIENT REFUSAL: ICD-10-CM

## 2022-12-28 DIAGNOSIS — O99.281 ENDOCRINE, NUTRITIONAL AND METABOLIC DISEASES COMPLICATING PREGNANCY, FIRST TRIMESTER: ICD-10-CM

## 2022-12-28 DIAGNOSIS — K59.00 CONSTIPATION, UNSPECIFIED: ICD-10-CM

## 2023-07-13 ENCOUNTER — INPATIENT (INPATIENT)
Facility: HOSPITAL | Age: 33
LOS: 1 days | Discharge: HOME | DRG: 540 | End: 2023-07-15
Attending: OBSTETRICS & GYNECOLOGY | Admitting: OBSTETRICS & GYNECOLOGY
Payer: MEDICAID

## 2023-07-13 ENCOUNTER — TRANSCRIPTION ENCOUNTER (OUTPATIENT)
Age: 33
End: 2023-07-13

## 2023-07-13 VITALS
DIASTOLIC BLOOD PRESSURE: 62 MMHG | SYSTOLIC BLOOD PRESSURE: 107 MMHG | WEIGHT: 257.94 LBS | TEMPERATURE: 98 F | HEART RATE: 92 BPM | HEIGHT: 66 IN | RESPIRATION RATE: 18 BRPM

## 2023-07-13 DIAGNOSIS — Z98.890 OTHER SPECIFIED POSTPROCEDURAL STATES: Chronic | ICD-10-CM

## 2023-07-13 LAB
ALLERGY+IMMUNOLOGY DIAG STUDY NOTE: SIGNIFICANT CHANGE UP
AMPHET UR-MCNC: NEGATIVE — SIGNIFICANT CHANGE UP
APPEARANCE UR: CLEAR — SIGNIFICANT CHANGE UP
BARBITURATES UR SCN-MCNC: NEGATIVE — SIGNIFICANT CHANGE UP
BASOPHILS # BLD AUTO: 0.02 K/UL — SIGNIFICANT CHANGE UP (ref 0–0.2)
BASOPHILS NFR BLD AUTO: 0.2 % — SIGNIFICANT CHANGE UP (ref 0–1)
BENZODIAZ UR-MCNC: NEGATIVE — SIGNIFICANT CHANGE UP
BILIRUB UR-MCNC: NEGATIVE — SIGNIFICANT CHANGE UP
BLD GP AB SCN SERPL QL: SIGNIFICANT CHANGE UP
BUPRENORPHINE SCREEN, URINE RESULT: NEGATIVE — SIGNIFICANT CHANGE UP
COCAINE METAB.OTHER UR-MCNC: NEGATIVE — SIGNIFICANT CHANGE UP
COLOR SPEC: YELLOW — SIGNIFICANT CHANGE UP
DIFF PNL FLD: NEGATIVE — SIGNIFICANT CHANGE UP
DIR ANTIGLOB POLYSPECIFIC INTERPRETATION: SIGNIFICANT CHANGE UP
EOSINOPHIL # BLD AUTO: 0.05 K/UL — SIGNIFICANT CHANGE UP (ref 0–0.7)
EOSINOPHIL NFR BLD AUTO: 0.6 % — SIGNIFICANT CHANGE UP (ref 0–8)
FENTANYL UR QL: NEGATIVE — SIGNIFICANT CHANGE UP
GLUCOSE UR QL: NEGATIVE — SIGNIFICANT CHANGE UP
HCT VFR BLD CALC: 40.1 % — SIGNIFICANT CHANGE UP (ref 37–47)
HGB BLD-MCNC: 13.8 G/DL — SIGNIFICANT CHANGE UP (ref 12–16)
IMM GRANULOCYTES NFR BLD AUTO: 0.6 % — HIGH (ref 0.1–0.3)
KETONES UR-MCNC: ABNORMAL
L&D DRUG SCREEN, URINE: SIGNIFICANT CHANGE UP
LEUKOCYTE ESTERASE UR-ACNC: NEGATIVE — SIGNIFICANT CHANGE UP
LYMPHOCYTES # BLD AUTO: 2.19 K/UL — SIGNIFICANT CHANGE UP (ref 1.2–3.4)
LYMPHOCYTES # BLD AUTO: 24.6 % — SIGNIFICANT CHANGE UP (ref 20.5–51.1)
MCHC RBC-ENTMCNC: 32.1 PG — HIGH (ref 27–31)
MCHC RBC-ENTMCNC: 34.4 G/DL — SIGNIFICANT CHANGE UP (ref 32–37)
MCV RBC AUTO: 93.3 FL — SIGNIFICANT CHANGE UP (ref 81–99)
METHADONE UR-MCNC: NEGATIVE — SIGNIFICANT CHANGE UP
MONOCYTES # BLD AUTO: 0.64 K/UL — HIGH (ref 0.1–0.6)
MONOCYTES NFR BLD AUTO: 7.2 % — SIGNIFICANT CHANGE UP (ref 1.7–9.3)
NEUTROPHILS # BLD AUTO: 5.95 K/UL — SIGNIFICANT CHANGE UP (ref 1.4–6.5)
NEUTROPHILS NFR BLD AUTO: 66.8 % — SIGNIFICANT CHANGE UP (ref 42.2–75.2)
NITRITE UR-MCNC: NEGATIVE — SIGNIFICANT CHANGE UP
NRBC # BLD: 0 /100 WBCS — SIGNIFICANT CHANGE UP (ref 0–0)
OPIATES UR-MCNC: NEGATIVE — SIGNIFICANT CHANGE UP
OXYCODONE UR-MCNC: NEGATIVE — SIGNIFICANT CHANGE UP
PCP UR-MCNC: NEGATIVE — SIGNIFICANT CHANGE UP
PH UR: 7 — SIGNIFICANT CHANGE UP (ref 5–8)
PLATELET # BLD AUTO: 222 K/UL — SIGNIFICANT CHANGE UP (ref 130–400)
PMV BLD: 9.4 FL — SIGNIFICANT CHANGE UP (ref 7.4–10.4)
PRENATAL SYPHILIS TEST: SIGNIFICANT CHANGE UP
PROPOXYPHENE QUALITATIVE URINE RESULT: NEGATIVE — SIGNIFICANT CHANGE UP
PROT UR-MCNC: SIGNIFICANT CHANGE UP
RBC # BLD: 4.3 M/UL — SIGNIFICANT CHANGE UP (ref 4.2–5.4)
RBC # FLD: 13.8 % — SIGNIFICANT CHANGE UP (ref 11.5–14.5)
SP GR SPEC: 1.02 — SIGNIFICANT CHANGE UP (ref 1.01–1.03)
UROBILINOGEN FLD QL: SIGNIFICANT CHANGE UP
WBC # BLD: 8.9 K/UL — SIGNIFICANT CHANGE UP (ref 4.8–10.8)
WBC # FLD AUTO: 8.9 K/UL — SIGNIFICANT CHANGE UP (ref 4.8–10.8)

## 2023-07-13 PROCEDURE — 80354 DRUG SCREENING FENTANYL: CPT

## 2023-07-13 PROCEDURE — 86870 RBC ANTIBODY IDENTIFICATION: CPT

## 2023-07-13 PROCEDURE — 86901 BLOOD TYPING SEROLOGIC RH(D): CPT

## 2023-07-13 PROCEDURE — 36415 COLL VENOUS BLD VENIPUNCTURE: CPT

## 2023-07-13 PROCEDURE — 90384 RH IG FULL-DOSE IM: CPT

## 2023-07-13 PROCEDURE — 80307 DRUG TEST PRSMV CHEM ANLYZR: CPT

## 2023-07-13 PROCEDURE — 85025 COMPLETE CBC W/AUTO DIFF WBC: CPT

## 2023-07-13 PROCEDURE — 85461 HEMOGLOBIN FETAL: CPT

## 2023-07-13 PROCEDURE — 81003 URINALYSIS AUTO W/O SCOPE: CPT

## 2023-07-13 PROCEDURE — 59025 FETAL NON-STRESS TEST: CPT

## 2023-07-13 PROCEDURE — 86592 SYPHILIS TEST NON-TREP QUAL: CPT

## 2023-07-13 PROCEDURE — 86077 PHYS BLOOD BANK SERV XMATCH: CPT

## 2023-07-13 PROCEDURE — 86900 BLOOD TYPING SEROLOGIC ABO: CPT

## 2023-07-13 PROCEDURE — 86850 RBC ANTIBODY SCREEN: CPT

## 2023-07-13 PROCEDURE — 86880 COOMBS TEST DIRECT: CPT

## 2023-07-13 RX ORDER — DIPHENHYDRAMINE HCL 50 MG
25 CAPSULE ORAL EVERY 6 HOURS
Refills: 0 | Status: DISCONTINUED | OUTPATIENT
Start: 2023-07-13 | End: 2023-07-15

## 2023-07-13 RX ORDER — CEFAZOLIN SODIUM 1 G
2000 VIAL (EA) INJECTION ONCE
Refills: 0 | Status: COMPLETED | OUTPATIENT
Start: 2023-07-13 | End: 2023-07-13

## 2023-07-13 RX ORDER — DIPHENHYDRAMINE HCL 50 MG
25 CAPSULE ORAL EVERY 6 HOURS
Refills: 0 | Status: COMPLETED | OUTPATIENT
Start: 2023-07-13 | End: 2024-06-10

## 2023-07-13 RX ORDER — OXYCODONE HYDROCHLORIDE 5 MG/1
5 TABLET ORAL ONCE
Refills: 0 | Status: DISCONTINUED | OUTPATIENT
Start: 2023-07-13 | End: 2023-07-15

## 2023-07-13 RX ORDER — LANOLIN
1 OINTMENT (GRAM) TOPICAL EVERY 6 HOURS
Refills: 0 | Status: DISCONTINUED | OUTPATIENT
Start: 2023-07-13 | End: 2023-07-15

## 2023-07-13 RX ORDER — MAGNESIUM HYDROXIDE 400 MG/1
30 TABLET, CHEWABLE ORAL
Refills: 0 | Status: DISCONTINUED | OUTPATIENT
Start: 2023-07-13 | End: 2023-07-15

## 2023-07-13 RX ORDER — FAMOTIDINE 10 MG/ML
20 INJECTION INTRAVENOUS ONCE
Refills: 0 | Status: DISCONTINUED | OUTPATIENT
Start: 2023-07-13 | End: 2023-07-13

## 2023-07-13 RX ORDER — SODIUM CHLORIDE 9 MG/ML
1000 INJECTION, SOLUTION INTRAVENOUS
Refills: 0 | Status: DISCONTINUED | OUTPATIENT
Start: 2023-07-13 | End: 2023-07-13

## 2023-07-13 RX ORDER — KETOROLAC TROMETHAMINE 30 MG/ML
30 SYRINGE (ML) INJECTION EVERY 6 HOURS
Refills: 0 | Status: DISCONTINUED | OUTPATIENT
Start: 2023-07-13 | End: 2023-07-14

## 2023-07-13 RX ORDER — SIMETHICONE 80 MG/1
80 TABLET, CHEWABLE ORAL EVERY 4 HOURS
Refills: 0 | Status: DISCONTINUED | OUTPATIENT
Start: 2023-07-13 | End: 2023-07-15

## 2023-07-13 RX ORDER — SODIUM CHLORIDE 9 MG/ML
1000 INJECTION, SOLUTION INTRAVENOUS ONCE
Refills: 0 | Status: COMPLETED | OUTPATIENT
Start: 2023-07-13 | End: 2023-07-13

## 2023-07-13 RX ORDER — OXYCODONE HYDROCHLORIDE 5 MG/1
5 TABLET ORAL
Refills: 0 | Status: DISCONTINUED | OUTPATIENT
Start: 2023-07-13 | End: 2023-07-15

## 2023-07-13 RX ORDER — CEFAZOLIN SODIUM 1 G
2000 VIAL (EA) INJECTION ONCE
Refills: 0 | Status: DISCONTINUED | OUTPATIENT
Start: 2023-07-13 | End: 2023-07-13

## 2023-07-13 RX ORDER — SENNA PLUS 8.6 MG/1
1 TABLET ORAL AT BEDTIME
Refills: 0 | Status: DISCONTINUED | OUTPATIENT
Start: 2023-07-13 | End: 2023-07-15

## 2023-07-13 RX ORDER — ACETAMINOPHEN 500 MG
975 TABLET ORAL
Refills: 0 | Status: DISCONTINUED | OUTPATIENT
Start: 2023-07-13 | End: 2023-07-15

## 2023-07-13 RX ORDER — CITRIC ACID/SODIUM CITRATE 300-500 MG
30 SOLUTION, ORAL ORAL ONCE
Refills: 0 | Status: DISCONTINUED | OUTPATIENT
Start: 2023-07-13 | End: 2023-07-13

## 2023-07-13 RX ORDER — IBUPROFEN 200 MG
600 TABLET ORAL EVERY 6 HOURS
Refills: 0 | Status: DISCONTINUED | OUTPATIENT
Start: 2023-07-13 | End: 2023-07-15

## 2023-07-13 RX ORDER — OXYTOCIN 10 UNIT/ML
333.33 VIAL (ML) INJECTION
Qty: 20 | Refills: 0 | Status: DISCONTINUED | OUTPATIENT
Start: 2023-07-13 | End: 2023-07-15

## 2023-07-13 RX ORDER — SODIUM CHLORIDE 9 MG/ML
1000 INJECTION, SOLUTION INTRAVENOUS
Refills: 0 | Status: DISCONTINUED | OUTPATIENT
Start: 2023-07-13 | End: 2023-07-15

## 2023-07-13 RX ADMIN — SODIUM CHLORIDE 2000 MILLILITER(S): 9 INJECTION, SOLUTION INTRAVENOUS at 13:00

## 2023-07-13 RX ADMIN — Medication 975 MILLIGRAM(S): at 22:00

## 2023-07-13 RX ADMIN — SODIUM CHLORIDE 125 MILLILITER(S): 9 INJECTION, SOLUTION INTRAVENOUS at 22:17

## 2023-07-13 RX ADMIN — Medication 100 MILLIGRAM(S): at 14:47

## 2023-07-13 RX ADMIN — Medication 25 MILLIGRAM(S): at 23:03

## 2023-07-13 RX ADMIN — Medication 975 MILLIGRAM(S): at 21:22

## 2023-07-13 RX ADMIN — SODIUM CHLORIDE 125 MILLILITER(S): 9 INJECTION, SOLUTION INTRAVENOUS at 13:30

## 2023-07-13 NOTE — BRIEF OPERATIVE NOTE - OPERATION/FINDINGS
Prior pfannenstiel skin incision,   low transverse uterine incision   intraabdominal adhesions.   gravid uterus, normal fallopian tubes and ovaries bilaterally

## 2023-07-13 NOTE — PROGRESS NOTE ADULT - ASSESSMENT
33y now P3, Rh neg, s/p repeat C/S x3, POD0, AMA, h/o IUFD,    -Monitor vitals  -Pain management PRN  -Encourage ambulation  -Incentive spirometry  -PO hydration  -DVT ppx: SCDs  -Rhogam ordered    Dr. Ocasio and Dr. Prakash to be made aware.    33y now P3, GBS neg, Rh neg, s/p repeat C/S x3, POD0, AMA, h/o IUFD,    -Monitor vitals  -Pain management PRN  -Encourage ambulation  -Incentive spirometry  -PO hydration  -DVT ppx: SCDs  -Rhogam ordered    Dr. Ocasio and Dr. Prakash to be made aware.

## 2023-07-13 NOTE — OB PROVIDER H&P - NS_OBGYNHISTORY_OBGYN_ALL_OB_FT
OB Hx:  x 1. Largest -             C/S x 2               ETOP x 2. D&C x 2    G1 2011 FT  M - [facility] No complications +Epi  G2 3/2017 FT C/S complete previa IUFD at 37 weeks (true knot) M [facility]  G3 2018 FT rpt C/S M 7- [facility] No complications      Gyn Hx:  Denies cysts, fibroids, abnormal paps, and STIs.

## 2023-07-13 NOTE — OB RN PATIENT PROFILE - NS TRANSFER DISPOSITION PATIENT BELONGINGS
with patient Skin Substitute Text: The defect edges were debeveled with a #15 scalpel blade.  Given the location of the defect, shape of the defect and the proximity to free margins a skin substitute graft was deemed most appropriate.  The graft material was trimmed to fit the size of the defect. The graft was then placed in the primary defect and oriented appropriately.

## 2023-07-13 NOTE — OB RN DELIVERY SUMMARY - NSSELHIDDEN_OBGYN_ALL_OB_FT
[NS_DeliveryAttending1_OBGYN_ALL_OB_FT:[id number]=],[NS_DeliveryRN_OBGYN_ALL_OB_FT:[id number]=],[NS_CirculateRN2_OBGYN_ALL_OB_FT:[id number]=] [NS_DeliveryAttending1_OBGYN_ALL_OB_FT:[id number]=],[NS_DeliveryRN_OBGYN_ALL_OB_FT:[id number]=],[NS_CirculateRN2_OBGYN_ALL_OB_FT:[id number]=],[NS_DeliveryAssist1_OBGYN_ALL_OB_FT:[id number]=]

## 2023-07-13 NOTE — PROGRESS NOTE ADULT - SUBJECTIVE AND OBJECTIVE BOX
PGY1 NOTE  Chief Complaint: Post  section    HPI: Pt doing well, pain well controlled. No acute complaints. Endorses some mild diffuse itching. Cross in place. Has not yet ambulated. Tolerating PO w/out difficulty. Has not yet passed gas.   Denies HA, lightheadedness, palpitations, N/V, fevers, chills, CP, SOB, LE edema, heavy vaginal bleeding.     PAST MEDICAL & SURGICAL HISTORY:  No pertinent past medical history     delivery delivered    H/O dilation and curettage    Physical Exam  Vital Signs Last 24 Hrs  T(F): 99.5 (2023 19:15), Max: 99.5 (2023 19:15)  HR: 75 (2023 19:15) (72 - 92)  BP: 121/66 (2023 19:15) (107/62 - 121/66)  RR: 18 (2023 19:15) (18 - 18)    Physical exam:  General - AAOx3, NAD  Heart - S1S2, RRR  Lungs - CTA BL  Abdomen:  - Soft, nontender, mildly distended, BS+  - Fundus firm, appropriately tender, below the umbilicus  Incision - Clean, dry, intact dermabond over pfannenstiel skin incision.  Pelvis/Vagina - Minimal rubra lochia  Extremities - No calf tenderness, no swelling    Labs:                        13.8   8.90  )-----------( 222      ( 2023 13:02 )             40.1     Antibody Screen: POS (23 @ 13:13)    Prenatal Syphilis Test: Nonreact (23 @ 13:02)    Urine Output: 280cc    acetaminophen     Tablet .. 975 milliGRAM(s) Oral <User Schedule>, Routine  diphenhydrAMINE 25 milliGRAM(s) Oral every 6 hours, Routine PRN Pruritus  ibuprofen  Tablet. 600 milliGRAM(s) Oral every 6 hours, Routine  ketorolac   Injectable 30 milliGRAM(s) IV Push every 6 hours, Routine  lactated ringers. 1000 milliLiter(s) IV Continuous <Continuous>, Routine  lanolin Ointment 1 Application(s) Topical every 6 hours, Routine PRN Sore Nipples  magnesium hydroxide Suspension 30 milliLiter(s) Oral two times a day, Routine PRN Constipation  oxyCODONE    IR 5 milliGRAM(s) Oral every 3 hours, Routine PRN Moderate to Severe Pain (4-10)  oxyCODONE    IR 5 milliGRAM(s) Oral once, Routine PRN Moderate to Severe Pain (4-10)  oxytocin Infusion 333.333 milliUNIT(s)/Min IV Continuous <Continuous>, Routine  oxytocin Infusion 333.333 milliUNIT(s)/Min IV Continuous <Continuous>, Routine  senna 1 Tablet(s) Oral at bedtime, Routine  simethicone 80 milliGRAM(s) Chew every 4 hours, Routine PRN Gas  ----

## 2023-07-13 NOTE — OB PROVIDER H&P - NSHPPHYSICALEXAM_GEN_ALL_CORE
T(C): 36.9 (07-13-23 @ 12:52), Max: 36.9 (07-13-23 @ 12:52)  HR: 92 (07-13-23 @ 13:01) (92 - 92)  BP: 107/62 (07-13-23 @ 13:01) (107/62 - 107/62)  RR: 18 (07-13-23 @ 12:52) (18 - 18)    EFM: 150 bpm, moderate variability, +accels  TOCO: none    Abd: soft, gravid, nontender, no incisional tenderness

## 2023-07-13 NOTE — OB PROVIDER H&P - ASSESSMENT
33y GP @ weeks, AMA, h/o prior c/s x _, scheduled rpt c/s x _.    Plan:  Admit to L&D  IVF  NPO diet  Continuous TOCO/EFM  Ancef prior to OR  Abdominal prep  SCDs B/L  Pain Management PRN    Dr. Prakash aware 33y  @ 38.3 weeks, AMA, h/o IUFD and prior c/s x 2, scheduled rpt c/s x 3.    Plan:  Admit to L&D  IVF  NPO diet  Continuous TOCO/EFM  Ancef prior to OR  Abdominal prep  SCDs B/L  Pain Management PRN    Dr. Prakash aware

## 2023-07-13 NOTE — OB PROVIDER H&P - HISTORY OF PRESENT ILLNESS
33y  @ 38.3 weeks by LMP, XAVIER 2023, AMA, presents for scheduled repeat C/S #3. Patient has h/o IUFD at 37 weeks and prior C/S x 2. Denies VB, LOF, and ctx. +FM. No complications during this pregnancy.

## 2023-07-13 NOTE — OB RN INTRAOPERATIVE NOTE - NSSELHIDDEN_OBGYN_ALL_OB_FT
Pt is aware of negative std testing.   [NS_DeliveryAttending1_OBGYN_ALL_OB_FT:[id number]=],[NS_DeliveryRN_OBGYN_ALL_OB_FT:[id number]=],[NS_CirculateRN2_OBGYN_ALL_OB_FT:[id number]=] [NS_DeliveryAttending1_OBGYN_ALL_OB_FT:[id number]=],[NS_DeliveryRN_OBGYN_ALL_OB_FT:[id number]=],[NS_CirculateRN2_OBGYN_ALL_OB_FT:[id number]=],[NS_DeliveryAssist1_OBGYN_ALL_OB_FT:[id number]=]

## 2023-07-13 NOTE — OB PROVIDER DELIVERY SUMMARY - NSSELHIDDEN_OBGYN_ALL_OB_FT
[NS_DeliveryAttending1_OBGYN_ALL_OB_FT:[id number]=],[NS_DeliveryRN_OBGYN_ALL_OB_FT:[id number]=],[NS_CirculateRN2_OBGYN_ALL_OB_FT:[id number]=],[NS_DeliveryAssist1_OBGYN_ALL_OB_FT:[id number]=]

## 2023-07-13 NOTE — OB PROVIDER DELIVERY SUMMARY - NSPROVIDERDELIVERYNOTE_OBGYN_ALL_OB_FT
Repeat C/s #3, pfannenstiel skin incision, low transverse uterine incision  Normal gravid uterus, normal fallopian tubes and ovaries bilaterally.   Viable female infant, APGARs 9/9, weighing 3510g.

## 2023-07-13 NOTE — CHART NOTE - NSCHARTNOTEFT_GEN_A_CORE
PACU ANESTHESIA ADMISSION NOTE      Procedure: Repeat C/Section #3  Post op diagnosis:      ____  Intubated  TV:______       Rate: ______      FiO2: ______    _x___  Patent Airway    _x___  Full return of protective reflexes    ___  Full recovery from anesthesia / back to baseline status    Vitals:            T:  97.5              BP :    119/67            R:  16            Sat:  98             P: 73      Mental Status:  _x___ Awake   _____ Alert   _____ Drowsy   _____ Sedated    Nausea/Vomiting:  _x___  NO       ______Yes,   See Post - Op Orders         Pain Scale (0-10):  __0___    Treatment: _x___ None    ____ See Post - Op/PCA Orders    Post - Operative Fluids:   ____ Oral   __x__ See Post - Op Orders    Plan: Discharge:   ___Home       __x__Floor     _____Critical Care    _____  Other:_________________    Comments:  No anesthesia issues or complications noted.  Discharge when criteria met.

## 2023-07-13 NOTE — OB RN PATIENT PROFILE - FUNCTIONAL ASSESSMENT - BASIC MOBILITY 6.
4-calculated by average/Not able to assess (calculate score using [facility] averaging method)

## 2023-07-14 ENCOUNTER — TRANSCRIPTION ENCOUNTER (OUTPATIENT)
Age: 33
End: 2023-07-14

## 2023-07-14 VITALS
TEMPERATURE: 99 F | RESPIRATION RATE: 18 BRPM | SYSTOLIC BLOOD PRESSURE: 113 MMHG | HEART RATE: 84 BPM | DIASTOLIC BLOOD PRESSURE: 74 MMHG

## 2023-07-14 LAB
BASOPHILS # BLD AUTO: 0.02 K/UL — SIGNIFICANT CHANGE UP (ref 0–0.2)
BASOPHILS NFR BLD AUTO: 0.2 % — SIGNIFICANT CHANGE UP (ref 0–1)
EOSINOPHIL # BLD AUTO: 0.07 K/UL — SIGNIFICANT CHANGE UP (ref 0–0.7)
EOSINOPHIL NFR BLD AUTO: 0.7 % — SIGNIFICANT CHANGE UP (ref 0–8)
FETAL SCREEN: SIGNIFICANT CHANGE UP
HCT VFR BLD CALC: 37.6 % — SIGNIFICANT CHANGE UP (ref 37–47)
HGB BLD-MCNC: 13 G/DL — SIGNIFICANT CHANGE UP (ref 12–16)
IMM GRANULOCYTES NFR BLD AUTO: 0.3 % — SIGNIFICANT CHANGE UP (ref 0.1–0.3)
LYMPHOCYTES # BLD AUTO: 1.85 K/UL — SIGNIFICANT CHANGE UP (ref 1.2–3.4)
LYMPHOCYTES # BLD AUTO: 19.4 % — LOW (ref 20.5–51.1)
MCHC RBC-ENTMCNC: 33.1 PG — HIGH (ref 27–31)
MCHC RBC-ENTMCNC: 34.6 G/DL — SIGNIFICANT CHANGE UP (ref 32–37)
MCV RBC AUTO: 95.7 FL — SIGNIFICANT CHANGE UP (ref 81–99)
MONOCYTES # BLD AUTO: 0.82 K/UL — HIGH (ref 0.1–0.6)
MONOCYTES NFR BLD AUTO: 8.6 % — SIGNIFICANT CHANGE UP (ref 1.7–9.3)
NEUTROPHILS # BLD AUTO: 6.74 K/UL — HIGH (ref 1.4–6.5)
NEUTROPHILS NFR BLD AUTO: 70.8 % — SIGNIFICANT CHANGE UP (ref 42.2–75.2)
NRBC # BLD: 0 /100 WBCS — SIGNIFICANT CHANGE UP (ref 0–0)
PLATELET # BLD AUTO: 189 K/UL — SIGNIFICANT CHANGE UP (ref 130–400)
PMV BLD: 9.4 FL — SIGNIFICANT CHANGE UP (ref 7.4–10.4)
RBC # BLD: 3.93 M/UL — LOW (ref 4.2–5.4)
RBC # FLD: 13.7 % — SIGNIFICANT CHANGE UP (ref 11.5–14.5)
WBC # BLD: 9.53 K/UL — SIGNIFICANT CHANGE UP (ref 4.8–10.8)
WBC # FLD AUTO: 9.53 K/UL — SIGNIFICANT CHANGE UP (ref 4.8–10.8)

## 2023-07-14 RX ORDER — SIMETHICONE 80 MG/1
1 TABLET, CHEWABLE ORAL
Qty: 0 | Refills: 0 | DISCHARGE
Start: 2023-07-14

## 2023-07-14 RX ORDER — SENNA PLUS 8.6 MG/1
1 TABLET ORAL
Qty: 0 | Refills: 0 | DISCHARGE
Start: 2023-07-14

## 2023-07-14 RX ORDER — IBUPROFEN 200 MG
1 TABLET ORAL
Qty: 0 | Refills: 0 | DISCHARGE
Start: 2023-07-14

## 2023-07-14 RX ORDER — ACETAMINOPHEN 500 MG
3 TABLET ORAL
Qty: 0 | Refills: 0 | DISCHARGE
Start: 2023-07-14

## 2023-07-14 RX ADMIN — Medication 30 MILLIGRAM(S): at 12:40

## 2023-07-14 RX ADMIN — Medication 30 MILLIGRAM(S): at 07:23

## 2023-07-14 RX ADMIN — Medication 30 MILLIGRAM(S): at 18:14

## 2023-07-14 RX ADMIN — SIMETHICONE 80 MILLIGRAM(S): 80 TABLET, CHEWABLE ORAL at 18:06

## 2023-07-14 RX ADMIN — Medication 30 MILLIGRAM(S): at 00:27

## 2023-07-14 RX ADMIN — Medication 30 MILLIGRAM(S): at 06:40

## 2023-07-14 RX ADMIN — Medication 30 MILLIGRAM(S): at 12:10

## 2023-07-14 RX ADMIN — Medication 30 MILLIGRAM(S): at 00:11

## 2023-07-14 RX ADMIN — SENNA PLUS 1 TABLET(S): 8.6 TABLET ORAL at 00:11

## 2023-07-14 NOTE — DISCHARGE NOTE OB - CARE PROVIDER_API CALL
Esme Prakash  Obstetrics and Gynecology  [address] [place] [address]  Phone: [phone]  Fax: [phone]  Follow Up Time:

## 2023-07-14 NOTE — PROGRESS NOTE ADULT - SUBJECTIVE AND OBJECTIVE BOX
PGY1 Note:  Section    Pt seen and evaluated at bedside. Pain well controlled. Denies dizziness/lightheadedness/CP/SOB/palpitations. Denies fever, chills, nausea/vomiting, diarrhea, dysuria, LE pain.     Ambulating: Yes  Voiding: Cross in place, adequate output   Flatus: No  Bowel movements: No  Breast or bottle feeding: breast  Diet: tolerating regular diet     Physical Exam  Vital Signs Last 24 Hrs  T(C): 37.1 (2023 23:50), Max: 37.5 (2023 19:15)  T(F): 98.8 (2023 23:50), Max: 99.5 (2023 19:15)  HR: 82 (2023 23:50) (72 - 92)  BP: 105/56 (2023 23:50) (105/56 - 121/66)  RR: 18 (2023 23:50) (18 - 18)  SpO2: 99% (2023 17:00) (99% - 99%)    Gen: AAOx3, NAD  Heart: RRR, S1S2+  Lungs: CTA B/L, no r/r/w   Fundus: firm, below umbilicus   Wound: Pfannenstiel incision, steris in place c/d/i   Abd: Soft, nontender, nondistended, BS+  Lochia: minimal   Ext: No calf tenderness, no swelling    Labs:                        13.8   8.90  )-----------( 222      ( 2023 13:02 )             40.1        MEDICATIONS  (STANDING):  acetaminophen     Tablet .. 975 milliGRAM(s) Oral <User Schedule>  ibuprofen  Tablet. 600 milliGRAM(s) Oral every 6 hours  ketorolac   Injectable 30 milliGRAM(s) IV Push every 6 hours  lactated ringers. 1000 milliLiter(s) (125 mL/Hr) IV Continuous <Continuous>  oxytocin Infusion 333.333 milliUNIT(s)/Min (1000 mL/Hr) IV Continuous <Continuous>  oxytocin Infusion 333.333 milliUNIT(s)/Min (1000 mL/Hr) IV Continuous <Continuous>  senna 1 Tablet(s) Oral at bedtime    MEDICATIONS  (PRN):  diphenhydrAMINE 25 milliGRAM(s) Oral every 6 hours PRN Pruritus  lanolin Ointment 1 Application(s) Topical every 6 hours PRN Sore Nipples  magnesium hydroxide Suspension 30 milliLiter(s) Oral two times a day PRN Constipation  oxyCODONE    IR 5 milliGRAM(s) Oral every 3 hours PRN Moderate to Severe Pain (4-10)  oxyCODONE    IR 5 milliGRAM(s) Oral once PRN Moderate to Severe Pain (4-10)  simethicone 80 milliGRAM(s) Chew every 4 hours PRN Gas

## 2023-07-14 NOTE — DISCHARGE NOTE OB - HOSPITAL COURSE
admitted for repeat c/s#3, uncomplicated operative and postoperative course, discharged in stable condition

## 2023-07-14 NOTE — DISCHARGE NOTE OB - PATIENT PORTAL LINK FT
You can access the FollowMyHealth Patient Portal offered by [facility] by registering at the following website: [email or web address]. By joining [employer]’s FollowMyHealth portal, you will also be able to view your health information using other applications (apps) compatible with our system.

## 2023-07-14 NOTE — DISCHARGE NOTE OB - MEDICATION SUMMARY - MEDICATIONS TO TAKE
I will START or STAY ON the medications listed below when I get home from the hospital:    ibuprofen 600 mg oral tablet  -- 1 tab(s) by mouth every 6 hours  -- Indication: For pain    acetaminophen 325 mg oral tablet  -- 3 tab(s) by mouth every 6 hours  -- Indication: For pain    senna leaf extract oral tablet  -- 1 tab(s) by mouth once a day (at bedtime)  -- Indication: For constipation    simethicone 80 mg oral tablet, chewable  -- 1 tab(s) by mouth every 4 hours As needed Gas  -- Indication: For gas pain

## 2023-07-14 NOTE — DISCHARGE NOTE OB - CARE PLAN
1 Principal Discharge DX:	 delivery delivered  Assessment and plan of treatment:	Keep incisions clean and dry. No heavy lifting x4 weeks. Nothing in the vagina for 6 weeks - no sex, tampons, douching, tub baths or pools. May Shower. If you have a fever over 100.4F, severe pain or severe bleeding, please call your doctor or visit the emergency room. Follow up in 1 week for incision check and 6 weeks for postpartum check with your doctor.

## 2023-07-14 NOTE — DISCHARGE NOTE OB - NS MD DC FALL RISK RISK
For information on Fall & Injury Prevention, visit: [email or web address] OR  [email or web address] OR  https://www.cdc.gov/steadi/patient.html

## 2023-07-14 NOTE — PROGRESS NOTE ADULT - ASSESSMENT
A/P: 33y now P3, s/p repeat c/s #3, pod1, recovering well     - pain management with PO pain meds   - monitor vitals/bleeding   - encourage incentive spirometry   - ambulation/PO hydration  - advance diet as tolerated   - simethicone  - SCDs for DVT prophylaxis   - Remove rice, TOV 6 hours following   - f/u AM labs   - routine postop care     Dr. De La Cruz and Dr. Prakash to be made aware.

## 2023-07-14 NOTE — DISCHARGE NOTE OB - MATERIALS PROVIDED
Vaccinations/[place]  Screening Program/Breastfeeding Log/Bottle Feeding Log/Guide to Postpartum Care/[facility] Hearing Screen Program/Back To Sleep Handout/Shaken Baby Prevention Handout/Breastfeeding Guide and Packet/Birth Certificate Instructions

## 2023-07-18 DIAGNOSIS — O34.211 MATERNAL CARE FOR LOW TRANSVERSE SCAR FROM PREVIOUS CESAREAN DELIVERY: ICD-10-CM

## 2023-07-18 DIAGNOSIS — O09.523 SUPERVISION OF ELDERLY MULTIGRAVIDA, THIRD TRIMESTER: ICD-10-CM

## 2023-07-18 DIAGNOSIS — Z3A.38 38 WEEKS GESTATION OF PREGNANCY: ICD-10-CM

## 2023-07-18 DIAGNOSIS — O33.9 MATERNAL CARE FOR DISPROPORTION, UNSPECIFIED: ICD-10-CM

## 2023-08-14 NOTE — OB RN PATIENT PROFILE - NS PRO RUBELLA RECEIVED Y/N
[address] [facility] coding opportunities     E66.01     Chart Reviewed number of suggestions sent to Provider: 1     Patients Insurance     Medicare Insurance: [employer]
no...

## 2023-10-04 ENCOUNTER — NON-APPOINTMENT (OUTPATIENT)
Age: 33
End: 2023-10-04

## 2023-10-05 ENCOUNTER — APPOINTMENT (OUTPATIENT)
Dept: ORTHOPEDIC SURGERY | Facility: CLINIC | Age: 33
End: 2023-10-05
Payer: MEDICAID

## 2023-10-05 VITALS — WEIGHT: 225 LBS | BODY MASS INDEX: 36.16 KG/M2 | HEIGHT: 66 IN

## 2023-10-05 DIAGNOSIS — M25.561 PAIN IN RIGHT KNEE: ICD-10-CM

## 2023-10-05 PROCEDURE — 99203 OFFICE O/P NEW LOW 30 MIN: CPT

## 2023-10-05 PROCEDURE — 73562 X-RAY EXAM OF KNEE 3: CPT | Mod: RT

## 2023-12-01 ENCOUNTER — APPOINTMENT (OUTPATIENT)
Dept: ORTHOPEDIC SURGERY | Facility: CLINIC | Age: 33
End: 2023-12-01

## 2024-07-20 NOTE — OB RN PATIENT PROFILE - CENTRAL VENOUS CATHETER
Airway patent, R TM erythematous and has a moderate discharge from the MT to the ear-canal. L TM normal  MT in place., normal appearing mouth, nose, throat, neck supple with full range of motion, no cervical adenopathy.
no

## 2025-03-27 ENCOUNTER — NON-APPOINTMENT (OUTPATIENT)
Age: 35
End: 2025-03-27
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
